# Patient Record
Sex: FEMALE | Race: BLACK OR AFRICAN AMERICAN | Employment: FULL TIME | URBAN - METROPOLITAN AREA
[De-identification: names, ages, dates, MRNs, and addresses within clinical notes are randomized per-mention and may not be internally consistent; named-entity substitution may affect disease eponyms.]

---

## 2018-10-22 ENCOUNTER — HOSPITAL ENCOUNTER (OUTPATIENT)
Dept: LAB | Age: 43
Discharge: HOME | End: 2018-10-22
Payer: COMMERCIAL

## 2018-10-22 ENCOUNTER — OFFICE VISIT (OUTPATIENT)
Dept: FAMILY MEDICINE CLINIC | Age: 43
End: 2018-10-22

## 2018-10-22 VITALS
SYSTOLIC BLOOD PRESSURE: 132 MMHG | HEIGHT: 63 IN | WEIGHT: 160 LBS | OXYGEN SATURATION: 99 % | DIASTOLIC BLOOD PRESSURE: 78 MMHG | RESPIRATION RATE: 18 BRPM | HEART RATE: 69 BPM | BODY MASS INDEX: 28.35 KG/M2 | TEMPERATURE: 98.4 F

## 2018-10-22 DIAGNOSIS — E66.3 OVERWEIGHT (BMI 25.0-29.9): ICD-10-CM

## 2018-10-22 DIAGNOSIS — Z01.419 WELL WOMAN EXAM WITH ROUTINE GYNECOLOGICAL EXAM: ICD-10-CM

## 2018-10-22 DIAGNOSIS — Z12.39 SCREENING FOR MALIGNANT NEOPLASM OF BREAST: ICD-10-CM

## 2018-10-22 DIAGNOSIS — Z76.89 ESTABLISHING CARE WITH NEW DOCTOR, ENCOUNTER FOR: Primary | ICD-10-CM

## 2018-10-22 PROCEDURE — 87624 HPV HI-RISK TYP POOLED RSLT: CPT | Performed by: FAMILY MEDICINE

## 2018-10-22 PROCEDURE — 88175 CYTOPATH C/V AUTO FLUID REDO: CPT | Performed by: FAMILY MEDICINE

## 2018-10-22 RX ORDER — ALBUTEROL SULFATE 90 UG/1
AEROSOL, METERED RESPIRATORY (INHALATION)
Refills: 6 | COMMUNITY
Start: 2018-09-04

## 2018-10-22 RX ORDER — BISMUTH SUBSALICYLATE 262 MG
1 TABLET,CHEWABLE ORAL DAILY
COMMUNITY

## 2018-10-22 RX ORDER — ASCORBIC ACID 250 MG
TABLET ORAL
COMMUNITY

## 2018-10-22 NOTE — PROGRESS NOTES
[facility] Note Subjective: Chief Complaint Patient presents with  New Patient  
  establish care  Other  
  needs referrel for mammo and pap Janusz Norris is a 37y.o. year old female who presents for evaluation of the following: 
 
Establishment of Care: 
Previous PCP: NP [facility] Care Team:  
Dentist- seen in past 1 year Optho- seen inpast 1 year GYN- None Allergist: [facility] PMH:  
Asthma: Tx: Proair prn + Dulera daily Using Albuterol use 3 times in past 2 months Never hospitalized, intubated Eczema:  
Palmar hands Tx: triamcinolone cream 
 
Allergies: 
Gets allergy shots for 5 years Tx: zyrtec Acute Concerns: 
Overweight: \"It doesn't come off\" Diet: \"I have a sweet tooth\" eats cake only once per week and not drinking soda, coffee with sweetened creamer,  
- dinner last night was butternut squash soup - Lunch spring rolls - Breakfast oatmeal 
Activity: Walks the dog 14 mile daily Goal: 10 lbs Social:  
Moved to  [facility] from [place] 6 years ago. [place] [address] [place] [address] as  Lives with  and 2 sons (25 and 15) Mood is \"good\" Health Maintenance:  
TDP: Not due Influenza: Declined Pneumovax:Done 10 year ago in [place] for asthma Prevnar @65: Not due Shingles @60: Not due Colonoscopy @50: Not due/ No family history ASA @ 55f and 45m: Not due Dexa @65: Not due HIV or other STD testing: Declined/ Due 
Domestic Violence Screen: Negative Depression Screen: Denies depression or anhedonia PHQ over the last two weeks 10/22/2018 Little interest or pleasure in doing things Not at all Feeling down, depressed, irritable, or hopeless Not at all Total Score PHQ 2 0 Smoker? No 
 
 
 Pap:  Last >5 years ago, never abnomal 
Mammogram:  Never had one. Patient's last menstrual period was 10/15/2018 (approximate). Menses Monthly, lasting 5 days. No recent worsening bleeding or intermenstrual bleeding 
 reports that she currently engages in sexual activity and has had partners who are Male. She reports using the following method of birth control/protection: None. Review of Systems Pertinent positives and negative per HPI. All other systems  reviewed are negative for a Comprehensive ROS (10+). Past Medical History:  
Diagnosis Date  Asthma  Eczema  H/O seasonal allergies Social History Socioeconomic History  Marital status:  Spouse name: Not on file  Number of children: Not on file  Years of education: Not on file  Highest education level: Not on file Social Needs  Financial resource strain: Not on file  Food insecurity - worry: Not on file  Food insecurity - inability: Not on file  Transportation needs - medical: Not on file  Transportation needs - non-medical: Not on file Occupational History  Not on file Tobacco Use  Smoking status: Never Smoker  Smokeless tobacco: Never Used Substance and Sexual Activity  Alcohol use: Yes Comment: ocassionally  Drug use: No  
 Sexual activity: Yes  
  Partners: Male Birth control/protection: None Other Topics Concern  Not on file Social History Narrative  Not on file Current Outpatient Medications Medication Sig  
 PROAIR HFA 90 mcg/actuation inhaler TAKE 2 PUFFS BY MOUTH EVERY 4 HOURS AS NEEDED  
 B.infantis-B.ani-B.long-B.bifi (PROBIOTIC 4X) 10-15 mg TbEC Take  by mouth.  ascorbic acid, vitamin C, (VITAMIN C) 250 mg tablet Take  by mouth.  multivitamin (ONE A DAY) tablet Take 1 Tab by mouth daily.  mometasone-formoterol (DULERA) 100-5 mcg/actuation HFA inhaler Take 2 Puffs by inhalation two (2) times a day.  Cetirizine 10 mg cap Take  by mouth.   
 triamcinolone acetonide (KENALOG) 0.1 % ointment Apply  to affected area three (3) times daily. use thin layer  clobetasol-emollient (TEMOVATE-E) 0.05 % topical cream Apply  to affected area two (2) times a day.  ipratropium-albuterol (COMBIVENT)  mcg/actuation inhaler Take  by inhalation every six (6) hours as needed for Wheezing. No current facility-administered medications for this visit. Objective:  
 
Vitals:  
 10/22/18 0820 BP: 132/78 Pulse: 69 Resp: 18 Temp: 98.4 °F (36.9 °C) TempSrc: Oral  
SpO2: 99% Weight: 160 lb (72.6 kg) Height: 5' 3\" (1.6 m) Physical Examination: 
General: Alert, cooperative, no distress, appears stated age. Eyes: Conjunctivae clear. PERRL, EOMs intact. Ears: Normal external ear canals both ears. TM clear and mobile bilaterally Nose: Nares normal. Septum midline. Mucosa normal. No drainage or sinus tenderness. Mouth/Throat: Lips, mucosa, and tongue normal.  
Neck: Supple, symmetrical, trachea midline, no adenopathy. No thyroid enlargement/tenderness/nodules Back: Symmetric, no curvature. ROM normal.  
Lungs: Clear to auscultation bilaterally. Normal inspiratory and expiratory ratio. Heart: Regular rate and rhythm, S1, S2 normal, no murmur, click, rub or gallop. Abdomen: Soft, non-tender. Bowel sounds normal. No masses or organomegaly. Extremities: Extremities normal, atraumatic, no cyanosis or edema. Pulses: 2+ and symmetric all extremities. Skin: Skin color, texture, turgor normal. No rashes or lesions on exposed skin. Lymph nodes: Cervical, supraclavicular nodes normal. 
Neurologic: CNII-XII intact. Strength 5/5 grossly. Sensation and reflexes normal throughout. No visits with results within 3 Month(s) from this visit. Latest known visit with results is:  
Office Visit on 01/11/2016 Component Date Value Ref Range Status  Glucose 01/16/2016 95  65 - 99 mg/dL Final  
 BUN 01/16/2016 10  6 - 24 mg/dL Final  
 Creatinine 01/16/2016 0.96  0.57 - 1.00 mg/dL Final  
  GFR est non-AA 01/16/2016 73  >59 mL/min/1.73 Final  
 GFR est AA 01/16/2016 84  >59 mL/min/1.73 Final  
 BUN/Creatinine ratio 01/16/2016 10  9 - 23 Final  
 Sodium 01/16/2016 139  134 - 144 mmol/L Final  
 Potassium 01/16/2016 4.1  3.5 - 5.2 mmol/L Final  
 Chloride 01/16/2016 103  97 - 108 mmol/L Final  
 CO2 01/16/2016 23  18 - 29 mmol/L Final  
 Calcium 01/16/2016 8.8  8.7 - 10.2 mg/dL Final  
 Cholesterol, total 01/16/2016 204* 100 - 199 mg/dL Final  
 Triglyceride 01/16/2016 93  0 - 149 mg/dL Final  
 HDL Cholesterol 01/16/2016 57  >39 mg/dL Final  
 Comment: According to ATP-III Guidelines, HDL-C >59 mg/dL is considered a 
negative risk factor for CHD.  VLDL, calculated 01/16/2016 19  5 - 40 mg/dL Final  
 LDL, calculated 01/16/2016 128* 0 - 99 mg/dL Final  
 ALT (SGPT) 01/16/2016 11  0 - 32 IU/L Final  
 AST (SGOT) 01/16/2016 15  0 - 40 IU/L Final  
 Specific Gravity 01/16/2016 1.022  1.005 - 1.030 Final  
 pH (UA) 01/16/2016 6.0  5.0 - 7.5 Final  
 Color 01/16/2016 Yellow  Yellow Final  
 Appearance 01/16/2016 Clear  Clear Final  
 Leukocyte Esterase 01/16/2016 Negative  Negative Final  
 Protein 01/16/2016 Negative  Negative/Trace Final  
 Glucose 01/16/2016 Negative  Negative Final  
 Ketone 01/16/2016 Negative  Negative Final  
 Blood 01/16/2016 Negative  Negative Final  
 Bilirubin 01/16/2016 Negative  Negative Final  
 Urobilinogen 01/16/2016 0.2  0.2 - 1.0 mg/dL Final  
 Nitrites 01/16/2016 Negative  Negative Final  
 Microscopic Examination 01/16/2016 Comment   Final  
 Microscopic not indicated and not performed.  Hemoglobin A1c 01/16/2016 5.9* 4.8 - 5.6 % Final  
 Comment:          Pre-diabetes: 5.7 - 6.4 Diabetes: >6.4 Glycemic control for adults with diabetes: <7.0  Estimated average glucose 01/16/2016 123  mg/dL Final  
 INTERPRETATION 01/16/2016 Note   Final  
 Supplement report is available. Assessment/ Plan: Diagnoses and all orders for this visit: 
 
Establishing care with new doctor, encounter for Well woman exam with routine gynecological exam 
-     CBC WITH AUTOMATED DIFF 
-     METABOLIC PANEL, COMPREHENSIVE 
-     LIPID PANEL 
-     HEMOGLOBIN A1C WITH EAG 
-     PAP IG, APTIMA HPV AND RFX 16/18,45 ([id number]); Future -     DREAD MAMMO BI SCREENING INCL CAD; Future Overweight (BMI 25.0-29. 9) Screening for malignant neoplasm of breast 
-     DREAD MAMMO BI SCREENING INCL CAD; Future Doing well overall Abnormal findings discussed below. STI screen declined. Labs to eval end organ function today Vaccines reviewed, PCV done 10 years ago Pap done today Mammogram ordered. Diet and activity modification encouraged for weight loss. Educated patient on red flag symptoms to warrant return to clinic or emergency room visit. I have discussed the diagnosis with the patient and the intended plan as seen in the above orders. The patient has been offered or received an after-visit summary and questions were answered concerning future plans. I have discussed medication side effects and warnings with the patient as well. Follow-up Disposition: 
Return in about 6 months (around 4/22/2019) for Follow Up. Signed, Lauren Lynn MD 
10/22/2018

## 2018-10-22 NOTE — PATIENT INSTRUCTIONS
Weight Loss Tips: 
 
Remember this is like a part time job so your motivation and commitment is key to your success. Use small plate only Drink 2 liters (1/2 gallon) of water every day 1/2 of every meal is fruit or vegetable Try meal prepping on Sunday with new different vegetables. Replace soda with diet soda or other zero sugar drinks (selter water just fine) Start using the [employer] laura for calorie counting. Goal 1800 calories per day Start work out at least 5 days per week for 40 minutes. Consider [employer] training laura for home exercises. Well Visit, Ages 25 to 48: Care Instructions Your Care Instructions Physical exams can help you stay healthy. Your doctor has checked your overall health and may have suggested ways to take good care of yourself. He or she also may have recommended tests. At home, you can help prevent illness with healthy eating, regular exercise, and other steps. Follow-up care is a key part of your treatment and safety. Be sure to make and go to all appointments, and call your doctor if you are having problems. It's also a good idea to know your test results and keep a list of the medicines you take. How can you care for yourself at home? · Reach and stay at a healthy weight. This will lower your risk for many problems, such as obesity, diabetes, heart disease, and high blood pressure. · Get at least 30 minutes of physical activity on most days of the week. Walking is a good choice. You also may want to do other activities, such as running, swimming, cycling, or playing tennis or team sports. Discuss any changes in your exercise program with your doctor. · Do not smoke or allow others to smoke around you. If you need help quitting, talk to your doctor about stop-smoking programs and medicines. These can increase your chances of quitting for good.  
· Talk to your doctor about whether you have any risk factors for sexually transmitted infections (STIs). Having one sex partner (who does not have STIs and does not have sex with anyone else) is a good way to avoid these infections. · Use birth control if you do not want to have children at this time. Talk with your doctor about the choices available and what might be best for you. · Protect your skin from too much sun. When you're outdoors from 10 a.m. to 4 p.m., stay in the shade or cover up with clothing and a hat with a wide brim. Wear sunglasses that block UV rays. Even when it's cloudy, put broad-spectrum sunscreen (SPF 30 or higher) on any exposed skin. · See a dentist one or two times a year for checkups and to have your teeth cleaned. · Wear a seat belt in the car. · Drink alcohol in moderation, if at all. That means no more than 2 drinks a day for men and 1 drink a day for women. Follow your doctor's advice about when to have certain tests. These tests can spot problems early. For everyone · Cholesterol. Have the fat (cholesterol) in your blood tested after age 21. Your doctor will tell you how often to have this done based on your age, family history, or other things that can increase your risk for heart disease. · Blood pressure. Have your blood pressure checked during a routine doctor visit. Your doctor will tell you how often to check your blood pressure based on your age, your blood pressure results, and other factors. · Vision. Talk with your doctor about how often to have a glaucoma test. 
· Diabetes. Ask your doctor whether you should have tests for diabetes. · Colon cancer. Have a test for colon cancer at age 48. You may have one of several tests. If you are younger than 48, you may need a test earlier if you have any risk factors. Risk factors include whether you already had a precancerous polyp removed from your colon or whether your parent, brother, sister, or child has had colon cancer. For women · Breast exam and mammogram. Talk to your doctor about when you should have a clinical breast exam and a mammogram. Medical experts differ on whether and how often women under 50 should have these tests. Your doctor can help you decide what is right for you. · Pap test and pelvic exam. Begin Pap tests at age 24. A Pap test is the best way to find cervical cancer. The test often is part of a pelvic exam. Ask how often to have this test. 
· Tests for sexually transmitted infections (STIs). Ask whether you should have tests for STIs. You may be at risk if you have sex with more than one person, especially if your partners do not wear condoms. For men · Tests for sexually transmitted infections (STIs). Ask whether you should have tests for STIs. You may be at risk if you have sex with more than one person, especially if you do not wear a condom. · Testicular cancer exam. Ask your doctor whether you should check your testicles regularly. · Prostate exam. Talk to your doctor about whether you should have a blood test (called a PSA test) for prostate cancer. Experts differ on whether and when men should have this test. Some experts suggest it if you are older than 39 and are -American or have a father or brother who got prostate cancer when he was younger than 72. When should you call for help? Watch closely for changes in your health, and be sure to contact your doctor if you have any problems or symptoms that concern you. Where can you learn more? Go to http://linsey-darlene.info/. Enter P072 in the search box to learn more about \"Well Visit, Ages 25 to 48: Care Instructions. \" Current as of: March 29, 2018 Content Version: 11.8 © 0051-5878 Healthwise, Incorporated. Care instructions adapted under license by [employer] (which disclaims liability or warranty for this information).  If you have questions about a medical condition or this instruction, always ask your healthcare professional. [place] [address] any warranty or liability for your use of this information.

## 2018-10-23 LAB
ALBUMIN SERPL-MCNC: 4.3 G/DL (ref 3.5–5.5)
ALBUMIN/GLOB SERPL: 2 {RATIO} (ref 1.2–2.2)
ALP SERPL-CCNC: 41 IU/L (ref 39–117)
ALT SERPL-CCNC: 15 IU/L (ref 0–32)
AST SERPL-CCNC: 15 IU/L (ref 0–40)
BASOPHILS # BLD AUTO: 0 X10E3/UL (ref 0–0.2)
BASOPHILS NFR BLD AUTO: 0 %
BILIRUB SERPL-MCNC: 0.3 MG/DL (ref 0–1.2)
BUN SERPL-MCNC: 10 MG/DL (ref 6–24)
BUN/CREAT SERPL: 10 (ref 9–23)
CALCIUM SERPL-MCNC: 9.2 MG/DL (ref 8.7–10.2)
CHLORIDE SERPL-SCNC: 102 MMOL/L (ref 96–106)
CHOLEST SERPL-MCNC: 217 MG/DL (ref 100–199)
CO2 SERPL-SCNC: 24 MMOL/L (ref 20–29)
CREAT SERPL-MCNC: 1.01 MG/DL (ref 0.57–1)
EOSINOPHIL # BLD AUTO: 0.1 X10E3/UL (ref 0–0.4)
EOSINOPHIL NFR BLD AUTO: 2 %
ERYTHROCYTE [DISTWIDTH] IN BLOOD BY AUTOMATED COUNT: 14.2 % (ref 12.3–15.4)
EST. AVERAGE GLUCOSE BLD GHB EST-MCNC: 114 MG/DL
GLOBULIN SER CALC-MCNC: 2.2 G/DL (ref 1.5–4.5)
GLUCOSE SERPL-MCNC: 89 MG/DL (ref 65–99)
HBA1C MFR BLD: 5.6 % (ref 4.8–5.6)
HCT VFR BLD AUTO: 39.6 % (ref 34–46.6)
HDLC SERPL-MCNC: 62 MG/DL
HGB BLD-MCNC: 13.5 G/DL (ref 11.1–15.9)
IMM GRANULOCYTES # BLD: 0 X10E3/UL (ref 0–0.1)
IMM GRANULOCYTES NFR BLD: 0 %
INTERPRETATION, 910389: NORMAL
LDLC SERPL CALC-MCNC: 141 MG/DL (ref 0–99)
LYMPHOCYTES # BLD AUTO: 1.2 X10E3/UL (ref 0.7–3.1)
LYMPHOCYTES NFR BLD AUTO: 29 %
MCH RBC QN AUTO: 29.7 PG (ref 26.6–33)
MCHC RBC AUTO-ENTMCNC: 34.1 G/DL (ref 31.5–35.7)
MCV RBC AUTO: 87 FL (ref 79–97)
MONOCYTES # BLD AUTO: 0.4 X10E3/UL (ref 0.1–0.9)
MONOCYTES NFR BLD AUTO: 9 %
NEUTROPHILS # BLD AUTO: 2.5 X10E3/UL (ref 1.4–7)
NEUTROPHILS NFR BLD AUTO: 60 %
PLATELET # BLD AUTO: 237 X10E3/UL (ref 150–379)
POTASSIUM SERPL-SCNC: 4.4 MMOL/L (ref 3.5–5.2)
PROT SERPL-MCNC: 6.5 G/DL (ref 6–8.5)
RBC # BLD AUTO: 4.54 X10E6/UL (ref 3.77–5.28)
SODIUM SERPL-SCNC: 140 MMOL/L (ref 134–144)
TRIGL SERPL-MCNC: 68 MG/DL (ref 0–149)
VLDLC SERPL CALC-MCNC: 14 MG/DL (ref 5–40)
WBC # BLD AUTO: 4.1 X10E3/UL (ref 3.4–10.8)

## 2018-10-25 NOTE — PROGRESS NOTES
Notify Patient: 
Most of your results look good. One of your cholesterol numbers was higher than normal. I recommend you adjust your diet to avoid foods monika in saturated fats such as greasy snack, fried food, and processed meats. Try replacing them with whole foods, like fruits and vegetables. Weight loss will also help with this.   
 
 
MD Note: 
ASCVD Risk 1.1%

## 2018-10-26 NOTE — PROGRESS NOTES
Outbound call to patient. No answer. Left message for patient to return call to office regarding labs. Results are also available on my chart.

## 2018-10-29 NOTE — PROGRESS NOTES
Notify Patient:  Your pap smear testing was negative for cervical cancer and the virus which causes it. Your next pap smear is due tin 5 years.

## 2018-11-01 ENCOUNTER — HOSPITAL ENCOUNTER (OUTPATIENT)
Dept: MAMMOGRAPHY | Age: 43
Discharge: HOME | End: 2018-11-01
Payer: COMMERCIAL

## 2018-11-01 DIAGNOSIS — Z12.39 SCREENING FOR MALIGNANT NEOPLASM OF BREAST: ICD-10-CM

## 2018-11-01 DIAGNOSIS — Z01.419 WELL WOMAN EXAM WITH ROUTINE GYNECOLOGICAL EXAM: ICD-10-CM

## 2018-11-01 PROCEDURE — 77067 SCR MAMMO BI INCL CAD: CPT

## 2018-11-01 NOTE — PROGRESS NOTES
Outbound call to patient. No answer. Left message for patient to return call regarding recent test results. Her results are also available to view on my chart to call with any questions.

## 2019-12-19 ENCOUNTER — HOSPITAL ENCOUNTER (OUTPATIENT)
Dept: MAMMOGRAPHY | Age: 44
Discharge: HOME | End: 2019-12-19
Payer: COMMERCIAL

## 2019-12-19 DIAGNOSIS — Z12.31 VISIT FOR SCREENING MAMMOGRAM: ICD-10-CM

## 2019-12-19 PROCEDURE — 77067 SCR MAMMO BI INCL CAD: CPT

## 2020-05-27 ENCOUNTER — VIRTUAL VISIT (OUTPATIENT)
Dept: FAMILY MEDICINE CLINIC | Age: 45
End: 2020-05-27

## 2020-05-27 ENCOUNTER — TELEPHONE (OUTPATIENT)
Dept: FAMILY MEDICINE CLINIC | Age: 45
End: 2020-05-27

## 2020-05-27 DIAGNOSIS — J45.909 UNCOMPLICATED ASTHMA, UNSPECIFIED ASTHMA SEVERITY, UNSPECIFIED WHETHER PERSISTENT: Primary | ICD-10-CM

## 2020-05-27 DIAGNOSIS — L30.9 ECZEMA OF BOTH HANDS: ICD-10-CM

## 2020-05-27 DIAGNOSIS — J30.1 ALLERGIC RHINITIS DUE TO POLLEN, UNSPECIFIED SEASONALITY: ICD-10-CM

## 2020-05-27 DIAGNOSIS — M25.511 ACUTE PAIN OF RIGHT SHOULDER: ICD-10-CM

## 2020-05-27 DIAGNOSIS — Z87.898 HISTORY OF PREDIABETES: ICD-10-CM

## 2020-05-27 NOTE — PATIENT INSTRUCTIONS
Shoulder Stretches: Exercises Introduction Here are some examples of exercises for you to try. The exercises may be suggested for a condition or for rehabilitation. Start each exercise slowly. Ease off the exercises if you start to have pain. You will be told when to start these exercises and which ones will work best for you. How to do the exercises Shoulder stretch 1.  a doorway and place one arm against the door frame. Your elbow should be a little higher than your shoulder. 2. Relax your shoulders as you lean forward, allowing your chest and shoulder muscles to stretch. You can also turn your body slightly away from your arm to stretch the muscles even more. 3. Hold for 15 to 30 seconds. 4. Repeat 2 to 4 times with each arm. Shoulder and chest stretch 1. Shoulder and chest stretch 2. While sitting, relax your upper body so you slump slightly in your chair. 3. As you breathe in, straighten your back and open your arms out to the sides. 4. Gently pull your shoulder blades back and downward. 5. Hold for 15 to 30 seconds as your breathe normally. 6. Repeat 2 to 4 times. Overhead stretch 1. Reach up over your head with both arms. 2. Hold for 15 to 30 seconds. 3. Repeat 2 to 4 times. Follow-up care is a key part of your treatment and safety. Be sure to make and go to all appointments, and call your doctor if you are having problems. It's also a good idea to know your test results and keep a list of the medicines you take. Where can you learn more? Go to http://linsey-darlene.info/ Enter S254 in the search box to learn more about \"Shoulder Stretches: Exercises. \" Current as of: June 26, 2019Content Version: 12.4 © 3310-0104 Healthwise, Incorporated. Care instructions adapted under license by [employer] (which disclaims liability or warranty for this information).  If you have questions about a medical condition or this instruction, always ask your healthcare professional. [address] any warranty or liability for your use of this information. Neck: Exercises Introduction Here are some examples of exercises for you to try. The exercises may be suggested for a condition or for rehabilitation. Start each exercise slowly. Ease off the exercises if you start to have pain. You will be told when to start these exercises and which ones will work best for you. How to do the exercises Neck stretch 5. This stretch works best if you keep your shoulder down as you lean away from it. To help you remember to do this, start by relaxing your shoulders and lightly holding on to your thighs or your chair. 6. Tilt your head toward your shoulder and hold for 15 to 30 seconds. Let the weight of your head stretch your muscles. 7. If you would like a little added stretch, use your hand to gently and steadily pull your head toward your shoulder. For example, keeping your right shoulder down, lean your head to the left. 8. Repeat 2 to 4 times toward each shoulder. Diagonal neck stretch 7. Turn your head slightly toward the direction you will be stretching, and tilt your head diagonally toward your chest and hold for 15 to 30 seconds. 8. If you would like a little added stretch, use your hand to gently and steadily pull your head forward on the diagonal. 
9. Repeat 2 to 4 times toward each side. Dorsal glide stretch 4. Sit or stand tall and look straight ahead. 5. Slowly tuck your chin as you glide your head backward over your body 6. Hold for a count of 6, and then relax for up to 10 seconds. 7. Repeat 8 to 12 times. Chest and shoulder stretch 1. Sit or stand tall and glide your head backward as in the dorsal glide stretch. 2. Raise both arms so that your hands are next to your ears. 3. Take a deep breath, and as you breathe out, lower your elbows down and behind your back. You will feel your shoulder blades slide down and together, and at the same time you will feel a stretch across your chest and the front of your shoulders. 4. Hold for about 6 seconds, and then relax for up to 10 seconds. 5. Repeat 8 to 12 times. Strengthening: Hands on head 1. Move your head backward, forward, and side to side against gentle pressure from your hands, holding each position for about 6 seconds. 2. Repeat 8 to 12 times. Follow-up care is a key part of your treatment and safety. Be sure to make and go to all appointments, and call your doctor if you are having problems. It's also a good idea to know your test results and keep a list of the medicines you take. Where can you learn more? Go to http://linsey-darlene.info/ Enter P975 in the search box to learn more about \"Neck: Exercises. \" Current as of: June 26, 2019Content Version: 12.4 © 4808-4750 Healthwise, Incorporated. Care instructions adapted under license by [employer] (which disclaims liability or warranty for this information). If you have questions about a medical condition or this instruction, always ask your healthcare professional. [address] any warranty or liability for your use of this information.

## 2020-05-27 NOTE — PROGRESS NOTES
[address] [address] Note      Subjective:     Chief Complaint   Patient presents with    Shoulder Pain     Magnolia Lewis is a 53y.o. year old female who presents for virtual evaluation of the following:      Shoulder Pain:   Onset: 1 month ago  Character: burn, soreness  Location: right neck, posterior right shoulder  Duration: intermittnent  Frequency: daily  Current Pain Ratin/10  Alleviating Factor: None  Aggravating Factor: sleeping on right  Treatment: ibuprofen 400mg prn  Endorses: None  Denies injury, joint swelling, chest pain, abdominal pain, radiation    Asthma:   Tx: Proair prn + Dulera daily  Using Albuterol use 3 times in past 2 months  Never hospitalized, intubated     Eczema:   Palmar hands  Tx: triamcinolone cream     Allergies:  Gets allergy shots for 5 years  Tx: zyrtec       Prediabetes:  Tx: None  Lab Results   Component Value Date/Time    Hemoglobin A1c 5.6 10/22/2018 09:28 AM         Social:   Moved to [place] from [place] 8 years ago. Originally from [place]  Works at [employer] as   Lives with  and 2 sons (25 and 15)    Care Team:   Dentist- seen in past 1 year  Optho- seen inpast 1 year   GYN- None  Allergist: [facility]      Review of Systems   Pertinent positives and negative per HPI. All other systems  reviewed are negative for a Comprehensive ROS (10+).        Past Medical History:   Diagnosis Date    Asthma     Eczema     H/O seasonal allergies         Social History     Socioeconomic History    Marital status:      Spouse name: Not on file    Number of children: Not on file    Years of education: Not on file    Highest education level: Not on file   Occupational History    Not on file   Social Needs    Financial resource strain: Not on file    Food insecurity     Worry: Not on file     Inability: Not on file    Transportation needs     Medical: Not on file     Non-medical: Not on file   Tobacco Use    Smoking status: Never Smoker    Smokeless tobacco: Never Used   Substance and Sexual Activity    Alcohol use: Yes     Comment: ocassionally     Drug use: No    Sexual activity: Yes     Partners: Male     Birth control/protection: None   Lifestyle    Physical activity     Days per week: Not on file     Minutes per session: Not on file    Stress: Not on file   Relationships    Social connections     Talks on phone: Not on file     Gets together: Not on file     Attends Yazidism service: Not on file     Active member of club or organization: Not on file     Attends meetings of clubs or organizations: Not on file     Relationship status: Not on file    Intimate partner violence     Fear of current or ex partner: Not on file     Emotionally abused: Not on file     Physically abused: Not on file     Forced sexual activity: Not on file   Other Topics Concern    Not on file   Social History Narrative    Not on file       Family History   Problem Relation Age of Onset    Hypertension Mother     Diabetes Father     Diabetes Maternal Grandmother     Hypertension Maternal Grandmother     Stroke Maternal Grandmother     Cancer Maternal Grandfather         lung-heavy smoker     Diabetes Maternal Grandfather     Hypertension Maternal Grandfather     Diabetes Paternal Grandmother     Hypertension Paternal Grandmother     Stroke Paternal Grandmother     Diabetes Paternal Grandfather     Hypertension Paternal Grandfather        Current Outpatient Medications   Medication Sig    PROAIR HFA 90 mcg/actuation inhaler TAKE 2 PUFFS BY MOUTH EVERY 4 HOURS AS NEEDED    B.infantis-B.ani-B.long-B.bifi (PROBIOTIC 4X) 10-15 mg TbEC Take  by mouth.  ascorbic acid, vitamin C, (VITAMIN C) 250 mg tablet Take  by mouth.  multivitamin (ONE A DAY) tablet Take 1 Tab by mouth daily.  mometasone-formoterol (DULERA) 100-5 mcg/actuation HFA inhaler Take 2 Puffs by inhalation two (2) times a day.     Cetirizine 10 mg cap Take  by mouth.  triamcinolone acetonide (KENALOG) 0.1 % ointment Apply  to affected area three (3) times daily. use thin layer     No current facility-administered medications for this visit. Objective: There were no vitals filed for this visit. Vitals measurement not available    Physical Examination:  General: Alert, cooperative, no distress, appears stated age. Eyes: Conjunctivae clear. Pupils equally round. Extraocular muscles intact. Ears: Normal appearing external ear   Nose: Nares normal appearing  Mouth/Throat: Lips, mucosa, and tongue normal. Moist mucous membranes. No tonsillar enlargement noted. Neck: Supple, symmetrical, trachea midline, no neck mass visualized. Respiratory: Breathing comfortably, in no acute respiratory distress. Cardiovascular: Visualized extremities without edema. MSK: Upper extremities normal appearing. Skin: No significant erythematous lesions or discoloration noted on facial skin. No rashes or lesions on exposed skin. Neurologic: No facial asymmetry. Normal gaze. Cranial nerves intact. Psychiatric: Affect appropriate. Mood euthymic. Thoughts logical. Speech volume and speed normal. No hallucinations. Well kempt. No visits with results within 3 Month(s) from this visit.    Latest known visit with results is:   Office Visit on 10/22/2018   Component Date Value Ref Range Status    WBC 10/22/2018 4.1  3.4 - 10.8 x10E3/uL Final    RBC 10/22/2018 4.54  3.77 - 5.28 x10E6/uL Final    HGB 10/22/2018 13.5  11.1 - 15.9 g/dL Final    HCT 10/22/2018 39.6  34.0 - 46.6 % Final    MCV 10/22/2018 87  79 - 97 fL Final    MCH 10/22/2018 29.7  26.6 - 33.0 pg Final    MCHC 10/22/2018 34.1  31.5 - 35.7 g/dL Final    RDW 10/22/2018 14.2  12.3 - 15.4 % Final    PLATELET 07/[birth year]1987 197  150 - 379 x10E3/uL Final    NEUTROPHILS 10/22/2018 60  Not Estab. % Final    Lymphocytes 10/22/2018 29  Not Estab. % Final    MONOCYTES 10/22/2018 9  Not Estab. % Final    EOSINOPHILS 10/22/2018 2  Not Estab. % Final    BASOPHILS 10/22/2018 0  Not Estab. % Final    ABS. NEUTROPHILS 10/22/2018 2.5  1.4 - 7.0 x10E3/uL Final    Abs Lymphocytes 10/22/2018 1.2  0.7 - 3.1 x10E3/uL Final    ABS. MONOCYTES 10/22/2018 0.4  0.1 - 0.9 x10E3/uL Final    ABS. EOSINOPHILS 10/22/2018 0.1  0.0 - 0.4 x10E3/uL Final    ABS. BASOPHILS 10/22/2018 0.0  0.0 - 0.2 x10E3/uL Final    IMMATURE GRANULOCYTES 10/22/2018 0  Not Estab. % Final    ABS. IMM. GRANS. 10/22/2018 0.0  0.0 - 0.1 x10E3/uL Final    Glucose 10/22/2018 89  65 - 99 mg/dL Final    BUN 10/22/2018 10  6 - 24 mg/dL Final    Creatinine 10/22/2018 1.01* 0.57 - 1.00 mg/dL Final    GFR est non-AA 10/22/2018 67  >59 mL/min/1.73 Final    GFR est AA 10/22/2018 78  >59 mL/min/1.73 Final    BUN/Creatinine ratio 10/22/2018 10  9 - 23 Final    Sodium 10/22/2018 140  134 - 144 mmol/L Final    Potassium 10/22/2018 4.4  3.5 - 5.2 mmol/L Final    Chloride 10/22/2018 102  96 - 106 mmol/L Final    CO2 10/22/2018 24  20 - 29 mmol/L Final    Calcium 10/22/2018 9.2  8.7 - 10.2 mg/dL Final    Protein, total 10/22/2018 6.5  6.0 - 8.5 g/dL Final    Albumin 10/22/2018 4.3  3.5 - 5.5 g/dL Final    GLOBULIN, TOTAL 10/22/2018 2.2  1.5 - 4.5 g/dL Final    A-G Ratio 10/22/2018 2.0  1.2 - 2.2 Final    Bilirubin, total 10/22/2018 0.3  0.0 - 1.2 mg/dL Final    Alk.  phosphatase 10/22/2018 41  39 - 117 IU/L Final    AST (SGOT) 10/22/2018 15  0 - 40 IU/L Final    ALT (SGPT) 10/22/2018 15  0 - 32 IU/L Final    Cholesterol, total 10/22/2018 217* 100 - 199 mg/dL Final    Triglyceride 10/22/2018 68  0 - 149 mg/dL Final    HDL Cholesterol 10/22/2018 62  >39 mg/dL Final    VLDL, calculated 10/22/2018 14  5 - 40 mg/dL Final    LDL, calculated 10/22/2018 141* 0 - 99 mg/dL Final    Hemoglobin A1c 10/22/2018 5.6  4.8 - 5.6 % Final    Comment:          Prediabetes: 5.7 - 6.4           Diabetes: >6.4           Glycemic control for adults with diabetes: <7.0  Estimated average glucose 10/22/2018 114  mg/dL Final    INTERPRETATION 10/22/2018 Note   Final    Comment: Medical Director's Note: Patient Last Name has been  corrected on 10/23/2018, was TRINA and now is Alyssa Lopez. Please review this report in its entirety, since  changes to patient demographics may affect result  interpretation(s) and/or treatment/follow-up suggestions. Supplemental report is available. Assessment/ Plan:   Diagnoses and all orders for this visit:    1. Uncomplicated asthma, unspecified asthma severity, unspecified whether persistent    2. Eczema of both hands    3. Allergic rhinitis due to pollen, unspecified seasonality    4. Acute pain of right shoulder        For today's visit, I did the following:  · Reviewed PMH as listed in orders  Labs to eval end organ function and etiology of chronic/acute concerns. Asthma, allergy, and eczema, stable. Managed by allergist.  Exercises and NSAIDs for musculoskeletal concern- right shoulder pain likely tendinitis or muscle strain. Improving. Negative depression screen. Follow up with specialists per routine. We discussed the expected course, resolution and complications of the diagnosis(es) in detail. Medication risks, benefits, costs, interactions, and alternatives were discussed as indicated. I advised her to contact the office if her condition worsens, changes or fails to improve as anticipated. She expressed understanding with the diagnosis(es) and plan. Adenike Acevedo is a 53 y.o. female being evaluated by a video visit encounter for concerns as above. A caregiver was present when appropriate. Due to this being a TeleHealth encounter (During ZSLUD-38 public health emergency), evaluation of the following organ systems was limited: Vitals/Constitutional/EENT/Resp/CV/GI//MS/Neuro/Skin/Heme-Lymph-Imm.   Pursuant to the emergency declaration under the [address] [facility], [address]387 waiver authority and the [employer] Act, this Virtual  Visit was conducted, with patient's (and/or legal guardian's) consent, to reduce the patient's risk of exposure to COVID-19 and provide necessary medical care. Services were provided through a video synchronous discussion virtually to substitute for in-person clinic visit. Provider was in the office while conducting this encounter. Patient was at home during encounter. Other persons participating in call: None  Consent:  She and/or her healthcare decision maker is aware that this patient-initiated Telehealth encounter is a billable service, with coverage as determined by her insurance carrier. She is aware that she may receive a bill and has provided verbal consent to proceed: Yes  This virtual visit was conducted via [employer]. Pursuant to the emergency declaration under the [address] [facility], 1135 waiver authority and the [employer] Act, this Virtual  Visit was conducted to reduce the patient's risk of exposure to COVID-19 and provide continuity of care for an established patient. Services were provided through a video synchronous discussion virtually to substitute for in-person clinic visit. Due to this being a TeleHealth evaluation, many elements of the physical examination are unable to be assessed. Total Time: minutes: 21-30 minutes. Educated patient on red flag symptoms to warrant return to clinic or emergency room visit. I have discussed the diagnosis with the patient and the intended plan as seen in the above orders. The patient has been offered or received an after-visit summary and questions were answered concerning future plans. I have discussed medication side effects and warnings with the patient as well.          Follow-up and Dispositions    · Return in about 3 months (around 8/27/2020) for Physical exam.         Signed,    Edi Callejas MD  5/27/2020

## 2020-05-27 NOTE — TELEPHONE ENCOUNTER
Outbound call placed to patient. name and  verified. Call placed to schedule patients 3 month CPE.  Patient scheduled for Monday, August 10, 2020 09:30 AM

## 2020-06-22 ENCOUNTER — TELEPHONE (OUTPATIENT)
Dept: PHARMACY | Age: 45
End: 2020-06-22

## 2020-06-22 NOTE — TELEPHONE ENCOUNTER
CLINICAL PHARMACY CONSULT: ASTHMA INHALER REVIEW  Dalton Elaine is a 53 y.o. female Identified as an Asthma care gap for [place]: high medication possession ratio of rescue to maintenance inhalers. Attempt made to contact pt. Left msg asking for return call. Will continue to attempt to contact pt as appropriate.     Mynor Cooper, PharmD, [facility]  Direct: [phone]  Department, toll free: [phone], option 7

## 2020-06-24 NOTE — TELEPHONE ENCOUNTER
CLINICAL PHARMACY CONSULT: ASTHMA INHALER REVIEW    SUBJECTIVE:   Mirela Pretty is a 53 y.o. female Identified as an Asthma care gap for [place]: high medication possession ratio of rescue to maintenance inhalers. OBJECTIVE:  Allergies   Allergen Reactions    Latex Rash    Cat Dander Shortness of Breath    Codeine Other (comments)     Migraine     Pollen Extracts Shortness of Breath and Runny Nose    Tomato Runny Nose       Medications per current medication list:  Current Outpatient Medications   Medication Sig Dispense Refill    PROAIR HFA 90 mcg/actuation inhaler TAKE 2 PUFFS BY MOUTH EVERY 4 HOURS AS NEEDED  6    B.infantis-B.ani-B.long-B.bifi (PROBIOTIC 4X) 10-15 mg TbEC Take  by mouth.  ascorbic acid, vitamin C, (VITAMIN C) 250 mg tablet Take  by mouth.  multivitamin (ONE A DAY) tablet Take 1 Tab by mouth daily.  mometasone-formoterol (DULERA) 100-5 mcg/actuation HFA inhaler Take 2 Puffs by inhalation two (2) times a day.  Cetirizine 10 mg cap Take  by mouth.  triamcinolone acetonide (KENALOG) 0.1 % ointment Apply  to affected area three (3) times daily.  use thin layer 30 g 3     Social History:   Social History     Tobacco Use    Smoking status: Never Smoker    Smokeless tobacco: Never Used   Substance Use Topics    Alcohol use: Yes     Comment: ocassionally        Immunizations:   Most Recent Immunizations   Administered Date(s) Administered    Tdap 01/11/2016       ASSESSMENT:  · Current Prescribed Inhalers:  · Rescue: albuterol MDI   · Maintenance: Dulera 100-5- using 2 puffs qAM, sts as per her allergist  · Note, per external dispense 13g/30ds and 13g = 120 inhalations or 2 puffs BID  · Patient states her and her allergist (prescriber) agreed on once per day @1 year ago and has provided sufficient symptom control  · Other Medications: cetirizine daily  · Inhaler technique/medication adherence: reviewed  · Medication cost: denies concern  · Spirometry/PFT on file: not found  · Pneumonia vaccine: appears due for Pneumovax - thinks she had done in [place], prior to moving to [place]  · Smoking status: non  · Exacerbations requiring oral steroids: no  · Is patient on non-selective beta blocker or NSAIDS?  no   · Triggers/Environmental changes: pollen, dander  · Asthma Control Test (completed in flowsheet), total score: 23  · Other potential [place]-reported care gaps (statin adherence/SUPD/SPC): not identified      PLAN:   Discussed avoiding triggers - watches weather channel/laura   Discussed COVID precautions - works from home and tries to stay in/home   Patient will try to double check her prev medical records to confirm Pneumovax    Encouraged discussion/follow-up with allergist (regular/monthly albuterol fills from @Jan through April at least, but patient reports now using only @1x/week - would she benefit from [facility] increase to BID?) - politely declines need to outreach at this time, reporting her breathing is fine    Maynor Cooper, PharmD, [facility]  Direct: [phone]  Department, toll free: [phone], option 7      =========================================================   For Pharmacy Admin Tracking Only    PHSO: PHSO Patient?: Yes  Total # of Interventions Recommended: Count: 2  - Maintenance Safety Lab Monitoring #: 1  Recommended intervention potential cost savings: 0  Total Interventions Accepted: 0  Time Spent (min): 20

## 2020-09-15 ENCOUNTER — OFFICE VISIT (OUTPATIENT)
Dept: FAMILY MEDICINE CLINIC | Age: 45
End: 2020-09-15
Payer: COMMERCIAL

## 2020-09-15 VITALS
SYSTOLIC BLOOD PRESSURE: 139 MMHG | HEART RATE: 73 BPM | DIASTOLIC BLOOD PRESSURE: 82 MMHG | RESPIRATION RATE: 18 BRPM | OXYGEN SATURATION: 99 % | WEIGHT: 163 LBS | BODY MASS INDEX: 28.88 KG/M2 | HEIGHT: 63 IN

## 2020-09-15 DIAGNOSIS — Z23 ENCOUNTER FOR IMMUNIZATION: ICD-10-CM

## 2020-09-15 DIAGNOSIS — Z12.31 ENCOUNTER FOR SCREENING MAMMOGRAM FOR MALIGNANT NEOPLASM OF BREAST: ICD-10-CM

## 2020-09-15 DIAGNOSIS — N92.6 IRREGULAR PERIODS: ICD-10-CM

## 2020-09-15 DIAGNOSIS — E66.3 OVERWEIGHT: ICD-10-CM

## 2020-09-15 DIAGNOSIS — Z00.00 WELL WOMAN EXAM (NO GYNECOLOGICAL EXAM): Primary | ICD-10-CM

## 2020-09-15 DIAGNOSIS — Z87.898 HISTORY OF PREDIABETES: ICD-10-CM

## 2020-09-15 PROCEDURE — 90732 PPSV23 VACC 2 YRS+ SUBQ/IM: CPT

## 2020-09-15 PROCEDURE — 99396 PREV VISIT EST AGE 40-64: CPT | Performed by: FAMILY MEDICINE

## 2020-09-15 PROCEDURE — 90471 IMMUNIZATION ADMIN: CPT | Performed by: FAMILY MEDICINE

## 2020-09-15 NOTE — PROGRESS NOTES
[facility] Note      Subjective:     Chief Complaint   Patient presents with    Complete Physical     Pt states she would not like to get a pap smear     Piper Ortiz is a 50y.o. year old female who presents for evaluation of the following:      Overweight:   Diet: avoiding fast food. Dinner last night flavia pizzoli  Activity: Walks the dog daily  Goal: 10 lbs down. Last Weight Metrics:  Weight Loss Metrics 9/15/2020 10/22/2018 2016   Today's Wt 163 lb 160 lb 157 lb   BMI 28.87 kg/m2 28.34 kg/m2 27.82 kg/m2     Prediabetes:  Tx: None  Lab Results   Component Value Date/Time    Hemoglobin A1c 5.6 10/22/2018 09:28 AM     Social:   Moved to [place] from [place] 8 years ago. Originally from [place]  Works at [employer] as   Lives with  and 2 sons (25 and 15)    Care Team:   Dentist- seen in past 1 year  Optho- seen in past 1 year   GYN- None  Allergist: [facility] Maintenance:   Health Maintenance   Topic Date Due    Pneumococcal 0-64 years (1 of 1 - PPSV23) 1979    A1C test (Diabetic or Prediabetic)  10/22/2019    Flu Vaccine (1) 2020    PAP AKA CERVICAL CYTOLOGY  10/22/2023    Lipid Screen  10/22/2023    DTaP/Tdap/Td series (2 - Td) 2026       HIV or other STD testing: Declined  Domestic Violence Screen:   Abuse Screening Questionnaire 9/15/2020   Do you ever feel afraid of your partner? N   Are you in a relationship with someone who physically or mentally threatens you? N   Is it safe for you to go home? Y       Depression Screen:   3 most recent PHQ Screens 9/15/2020   Little interest or pleasure in doing things Not at all   Feeling down, depressed, irritable, or hopeless Not at all   Total Score PHQ 2 0       Smoker? never      Pap:  Not due  Mammogram:  Never had one. Patient's last menstrual period was 09/15/2020 (exact date).    Menses Mirregular aNo recent worsening bleeding or intermenstrual bleeding   reports being sexually active and has had partner(s) who are Male. She reports using the following method of birth control/protection: None. Review of Systems   Pertinent positives and negative per HPI. All other systems  reviewed are negative for a Comprehensive ROS (10+).        Past Medical History:   Diagnosis Date    Asthma     Eczema     H/O seasonal allergies         Social History     Socioeconomic History    Marital status:      Spouse name: Not on file    Number of children: Not on file    Years of education: Not on file    Highest education level: Not on file   Occupational History    Not on file   Social Needs    Financial resource strain: Not on file    Food insecurity     Worry: Not on file     Inability: Not on file    Transportation needs     Medical: Not on file     Non-medical: Not on file   Tobacco Use    Smoking status: Never Smoker    Smokeless tobacco: Never Used   Substance and Sexual Activity    Alcohol use: Yes     Comment: ocassionally     Drug use: No    Sexual activity: Yes     Partners: Male     Birth control/protection: None   Lifestyle    Physical activity     Days per week: Not on file     Minutes per session: Not on file    Stress: Not on file   Relationships    Social connections     Talks on phone: Not on file     Gets together: Not on file     Attends [employer] service: Not on file     Active member of club or organization: Not on file     Attends meetings of clubs or organizations: Not on file     Relationship status: Not on file    Intimate partner violence     Fear of current or ex partner: Not on file     Emotionally abused: Not on file     Physically abused: Not on file     Forced sexual activity: Not on file   Other Topics Concern    Not on file   Social History Narrative    Not on file       Current Outpatient Medications   Medication Sig    PROAIR HFA 90 mcg/actuation inhaler TAKE 2 PUFFS BY MOUTH EVERY 4 HOURS AS NEEDED    B.infantis-B.ani-B.long-B.bifi (PROBIOTIC 4X) 10-15 mg TbEC Take  by mouth.  multivitamin (ONE A DAY) tablet Take 1 Tab by mouth daily.  mometasone-formoterol (DULERA) 100-5 mcg/actuation HFA inhaler Take 2 Puffs by inhalation two (2) times a day.  Cetirizine 10 mg cap Take  by mouth.  ascorbic acid, vitamin C, (VITAMIN C) 250 mg tablet Take  by mouth.  triamcinolone acetonide (KENALOG) 0.1 % ointment Apply  to affected area three (3) times daily. use thin layer (Patient not taking: Reported on 9/15/2020)     No current facility-administered medications for this visit. Objective:     Vitals:    09/15/20 0833 09/15/20 0843   BP: (!) 142/81 139/82   Pulse: 73    Resp: 18    SpO2: 99%    Weight: 163 lb (73.9 kg)    Height: 5' 3\" (1.6 m)        Physical Examination:  General: Alert, cooperative, no distress, appears stated age. Eyes: Conjunctivae clear. PERRL, EOMs intact. Ears: Normal external ear canals both ears. TM clear and mobile bilaterally  Nose: Nares normal. Septum midline. Mucosa normal. No drainage or sinus tenderness. Mouth/Throat: Lips, mucosa, and tongue normal.   Neck: Supple, symmetrical, trachea midline, no adenopathy. No thyroid enlargement/tenderness/nodules  Back: Symmetric, no curvature. ROM normal.   Lungs: Clear to auscultation bilaterally. Normal inspiratory and expiratory ratio. Heart: Regular rate and rhythm, S1, S2 normal, no murmur, click, rub or gallop. Abdomen: Soft, non-tender. Bowel sounds normal. No masses or organomegaly. Extremities: Extremities normal, atraumatic, no cyanosis or edema. Pulses: 2+ and symmetric all extremities. Skin: Skin color, texture, turgor normal. No rashes or lesions on exposed skin. Lymph nodes: Cervical, supraclavicular nodes normal.  Neurologic: CNII-XII intact. Strength 5/5 grossly. Sensation and reflexes normal throughout. No visits with results within 3 Month(s) from this visit.    Latest known visit with results is: Office Visit on 10/22/2018   Component Date Value Ref Range Status    WBC 10/22/2018 4.1  3.4 - 10.8 x10E3/uL Final    RBC 10/22/2018 4.54  3.77 - 5.28 x10E6/uL Final    HGB 10/22/2018 13.5  11.1 - 15.9 g/dL Final    HCT 10/22/2018 39.6  34.0 - 46.6 % Final    MCV 10/22/2018 87  79 - 97 fL Final    MCH 10/22/2018 29.7  26.6 - 33.0 pg Final    MCHC 10/22/2018 34.1  31.5 - 35.7 g/dL Final    RDW 10/22/2018 14.2  12.3 - 15.4 % Final    PLATELET 98/67/1417 181  150 - 379 x10E3/uL Final    NEUTROPHILS 10/22/2018 60  Not Estab. % Final    Lymphocytes 10/22/2018 29  Not Estab. % Final    MONOCYTES 10/22/2018 9  Not Estab. % Final    EOSINOPHILS 10/22/2018 2  Not Estab. % Final    BASOPHILS 10/22/2018 0  Not Estab. % Final    ABS. NEUTROPHILS 10/22/2018 2.5  1.4 - 7.0 x10E3/uL Final    Abs Lymphocytes 10/22/2018 1.2  0.7 - 3.1 x10E3/uL Final    ABS. MONOCYTES 10/22/2018 0.4  0.1 - 0.9 x10E3/uL Final    ABS. EOSINOPHILS 10/22/2018 0.1  0.0 - 0.4 x10E3/uL Final    ABS. BASOPHILS 10/22/2018 0.0  0.0 - 0.2 x10E3/uL Final    IMMATURE GRANULOCYTES 10/22/2018 0  Not Estab. % Final    ABS. IMM. GRANS.  10/22/2018 0.0  0.0 - 0.1 x10E3/uL Final    Glucose 10/22/2018 89  65 - 99 mg/dL Final    BUN 10/22/2018 10  6 - 24 mg/dL Final    Creatinine 10/22/2018 1.01* 0.57 - 1.00 mg/dL Final    GFR est non-AA 10/22/2018 67  >59 mL/min/1.73 Final    GFR est AA 10/22/2018 78  >59 mL/min/1.73 Final    BUN/Creatinine ratio 10/22/2018 10  9 - 23 Final    Sodium 10/22/2018 140  134 - 144 mmol/L Final    Potassium 10/22/2018 4.4  3.5 - 5.2 mmol/L Final    Chloride 10/22/2018 102  96 - 106 mmol/L Final    CO2 10/22/2018 24  20 - 29 mmol/L Final    Calcium 10/22/2018 9.2  8.7 - 10.2 mg/dL Final    Protein, total 10/22/2018 6.5  6.0 - 8.5 g/dL Final    Albumin 10/22/2018 4.3  3.5 - 5.5 g/dL Final    GLOBULIN, TOTAL 10/22/2018 2.2  1.5 - 4.5 g/dL Final    A-G Ratio 10/22/2018 2.0  1.2 - 2.2 Final    Bilirubin, total 10/22/2018 0.3  0.0 - 1.2 mg/dL Final    Alk. phosphatase 10/22/2018 41  39 - 117 IU/L Final    AST (SGOT) 10/22/2018 15  0 - 40 IU/L Final    ALT (SGPT) 10/22/2018 15  0 - 32 IU/L Final    Cholesterol, total 10/22/2018 217* 100 - 199 mg/dL Final    Triglyceride 10/22/2018 68  0 - 149 mg/dL Final    HDL Cholesterol 10/22/2018 62  >39 mg/dL Final    VLDL, calculated 10/22/2018 14  5 - 40 mg/dL Final    LDL, calculated 10/22/2018 141* 0 - 99 mg/dL Final    Hemoglobin A1c 10/22/2018 5.6  4.8 - 5.6 % Final    Comment:          Prediabetes: 5.7 - 6.4           Diabetes: >6.4           Glycemic control for adults with diabetes: <7.0      Estimated average glucose 10/22/2018 114  mg/dL Final    INTERPRETATION 10/22/2018 Note   Final    Comment: Medical Director's Note: Patient Last Name has been  corrected on 10/23/2018, was TRINA and now is Suma Delgado. Please review this report in its entirety, since  changes to patient demographics may affect result  interpretation(s) and/or treatment/follow-up suggestions. Supplemental report is available. Assessment/ Plan:   Diagnoses and all orders for this visit:    1. Well woman exam (no gynecological exam)  -     CBC WITH AUTOMATED DIFF  -     METABOLIC PANEL, COMPREHENSIVE  -     LIPID PANEL    2. History of prediabetes  -     HEMOGLOBIN A1C WITH EAG    3. Irregular periods  -     FSH AND LH  -     TESTOSTERONE, TOTAL, FEMALE/CHILD  -     CBC WITH AUTOMATED DIFF  -     TSH REFLEX TO T4    4. Overweight    5. Encounter for screening mammogram for malignant neoplasm of breast  -     [facility] 3D NI W MAMMO BI SCREENING INCL CAD; Future    6.  Encounter for immunization  -     PNEUMOCOCCAL POLYSACCHARIDE VACCINE, 23-VALENT, ADULT OR IMMUNOSUPPRESSED PT DOSE,  -     [place] IMMUNIZ ADMIN,1 SINGLE/COMB VAC/TOXOID      For today's visit, I did the following:  · Reviewed PMH as listed in orders  · Reviewed labs in detail or ordered lab  Labs to eval end organ function and etiology of chronic/acute concerns. Relevant vaccine, cancer screening and other health maintenance reviewed and updated per orders. - prefers flu shot in 1 month  Irregular periods, labs to evaluate. Diet and lifestyle modification encouraged for weight loss and chronic disease prevention/ management. Negative depression screen. Follow up with specialists per routine. Educated patient on red flag symptoms to warrant return to clinic or emergency room visit. I have discussed the diagnosis with the patient and the intended plan as seen in the above orders. The patient has been offered or received an after-visit summary and questions were answered concerning future plans. I have discussed medication side effects and warnings with the patient as well.     Follow-up and Dispositions    · Return in about 1 year (around 9/15/2021) for Physical exam.         Signed,    Isra Cabezas MD  9/15/2020

## 2020-09-15 NOTE — PROGRESS NOTES
Identified pt with two pt identifiers(name and ). Reviewed record in preparation for visit and have obtained necessary documentation. Chief Complaint   Patient presents with    Complete Physical     Pt states she would not like to get a pap smear        Health Maintenance Due   Topic    Pneumococcal 0-64 years (1 of 1 - PPSV23)    A1C test (Diabetic or Prediabetic)     Flu Vaccine (1)     Pneumococcal: Pt states she already got this vaccine  Flu vaccine: Pt would like to get it beginning of October    Visit Vitals  /82   Pulse 73   Resp 18   Ht 5' 3\" (1.6 m)   Wt 163 lb (73.9 kg)   SpO2 99%   BMI 28.87 kg/m²         Coordination of Care Questionnaire:  :   1) Have you been to an emergency room, urgent care, or hospitalized since your last visit? If yes, where when, and reason for visit? NO       2. Have seen or consulted any other health care provider since your last visit? If yes, where when, and reason for visit? NO      Patient is accompanied by self I have received verbal consent from [address] [place] [address] to discuss any/all medical information while they are present in the room.

## 2020-09-15 NOTE — PATIENT INSTRUCTIONS
Weight Loss Tips:  Remember this is like a part time job so your motivation and commitment is key to your success. Mindset  Weight loss like any other behavior change starts in your mind. Think hard about what your motivates you to lose weight then meditate on that. Remind yourself of your motivation  with phone alarms, scheduled meditation time, vision board, journal- just to name a few ideas. Have realistic goals. We expect with diligent healthy diet and physical activity you can lose 5% of your body weight in 3  months. Wt in lbs x 0.05 = #lbs you should lose in 3 months. Make food and activity changes with a goal of CONSISTENCY not perfection. Food  Start eating differently. Most of your weight loss and gain is from what you eat. Use small plates only  Drink 2 liters (1/2 gallon) of water every day  HALF of every meal should be fruit or vegetables  Try meal prepping on Sunday (or your day off) with new different vegetables. Consider meal prep service such as Cleaneatz[employer], wepremeals. com  Replace soda with diet soda or other zero sugar drinks (selter water just fine)  Consider using the [employer] laura for calorie counting. Goal 9103-1249 calories per day    Activity  Staying physically active will help you lose more weight and can help you get over the plateau when you weight just  won't change any more with diet. Start exercise at least 5 days per week for 40 minutes. Consider Socrative training laura for home exercises. You can start with walking. I suggest walking at a speed of at least 3.5-4.5mph to for the weight loss benefit. Increase your speed or distance every 2 weeks. Do some slow stretching daily of legs, arms and back. Consider adding weight training with light weights at home or at the gym. See a doctor or a physical training for  instructions in order to avoid injuries from doing muscle training incorrectly.   Free fitness program in [place]: [employer] org/program/fitness-warriors/         A Healthy Lifestyle: Care Instructions  Your Care Instructions     A healthy lifestyle can help you feel good, stay at a healthy weight, and have plenty of energy for both work and play. A healthy lifestyle is something you can share with your whole family. A healthy lifestyle also can lower your risk for serious health problems, such as high blood pressure, heart disease, and diabetes. You can follow a few steps listed below to improve your health and the health of your family. Follow-up care is a key part of your treatment and safety. Be sure to make and go to all appointments, and call your doctor if you are having problems. It's also a good idea to know your test results and keep a list of the medicines you take. How can you care for yourself at home? · Do not eat too much sugar, fat, or fast foods. You can still have dessert and treats now and then. The goal is moderation. · Start small to improve your eating habits. Pay attention to portion sizes, drink less juice and soda pop, and eat more fruits and vegetables. ? Eat a healthy amount of food. A 3-ounce serving of meat, for example, is about the size of a deck of cards. Fill the rest of your plate with vegetables and whole grains. ? Limit the amount of soda and sports drinks you have every day. Drink more water when you are thirsty. ? Eat at least 5 servings of fruits and vegetables every day. It may seem like a lot, but it is not hard to reach this goal. A serving or helping is 1 piece of fruit, 1 cup of vegetables, or 2 cups of leafy, raw vegetables. Have an apple or some carrot sticks as an afternoon snack instead of a candy bar. Try to have fruits and/or vegetables at every meal.  · Make exercise part of your daily routine. You may want to start with simple activities, such as walking, bicycling, or slow swimming. Try to be active 30 to 60 minutes every day.  You do not need to do all 30 to 60 minutes all at once. For example, you can exercise 3 times a day for 10 or 20 minutes. Moderate exercise is safe for most people, but it is always a good idea to talk to your doctor before starting an exercise program.  · Keep moving. Lis Josefa the lawn, work in the garden, or [employer]. Take the stairs instead of the elevator at work. · If you smoke, quit. People who smoke have an increased risk for heart attack, stroke, cancer, and other lung illnesses. Quitting is hard, but there are ways to boost your chance of quitting tobacco for good. ? Use nicotine gum, patches, or lozenges. ? Ask your doctor about stop-smoking programs and medicines. ? Keep trying. In addition to reducing your risk of diseases in the future, you will notice some benefits soon after you stop using tobacco. If you have shortness of breath or asthma symptoms, they will likely get better within a few weeks after you quit. · Limit how much alcohol you drink. Moderate amounts of alcohol (up to 2 drinks a day for men, 1 drink a day for women) are okay. But drinking too much can lead to liver problems, high blood pressure, and other health problems. Family health  If you have a family, there are many things you can do together to improve your health. · Eat meals together as a family as often as possible. · Eat healthy foods. This includes fruits, vegetables, lean meats and dairy, and whole grains. · Include your family in your fitness plan. Most people think of activities such as jogging or tennis as the way to fitness, but there are many ways you and your family can be more active. Anything that makes you breathe hard and gets your heart pumping is exercise. Here are some tips:  ? Walk to do errands or to take your child to school or the bus.  ? Go for a family bike ride after dinner instead of watching TV. Where can you learn more?   Go to http://linsey-darlene.info/  Enter W841 in the search box to learn more about \"A Healthy Lifestyle: Care Instructions. \"  Current as of: January 31, 2020               Content Version: 12.6  © 7259-5626 [employer]. Care instructions adapted under license by [employer] (which disclaims liability or warranty for this information). If you have questions about a medical condition or this instruction, always ask your healthcare professional. [address] any warranty or liability for your use of this information.

## 2021-03-26 ENCOUNTER — TELEPHONE (OUTPATIENT)
Dept: FAMILY MEDICINE CLINIC | Age: 46
End: 2021-03-26

## 2021-03-26 NOTE — TELEPHONE ENCOUNTER
Patient call was given referral for a mammogram on last September that she never went. Wants to know if she can use that same referral to have one done now.     Requesting a call back    Best call back # [phone]

## 2021-03-30 NOTE — TELEPHONE ENCOUNTER
Outbound call to patient. Patient made aware that can still use mammogram referral that was given. Patient verbalized understanding.

## 2021-04-13 ENCOUNTER — TRANSCRIBE ORDER (OUTPATIENT)
Dept: SCHEDULING | Age: 46
End: 2021-04-13

## 2021-04-13 DIAGNOSIS — Z12.31 SCREENING MAMMOGRAM FOR HIGH-RISK PATIENT: Primary | ICD-10-CM

## 2021-05-04 ENCOUNTER — HOSPITAL ENCOUNTER (OUTPATIENT)
Dept: MAMMOGRAPHY | Age: 46
Discharge: HOME | End: 2021-05-04
Payer: COMMERCIAL

## 2021-05-04 DIAGNOSIS — Z12.31 SCREENING MAMMOGRAM FOR HIGH-RISK PATIENT: ICD-10-CM

## 2021-05-04 PROCEDURE — 77067 SCR MAMMO BI INCL CAD: CPT

## 2022-03-19 PROBLEM — E66.3 OVERWEIGHT: Status: ACTIVE | Noted: 2018-10-22

## 2022-07-14 ENCOUNTER — OFFICE VISIT (OUTPATIENT)
Dept: INTERNAL MEDICINE CLINIC | Age: 47
End: 2022-07-14
Payer: COMMERCIAL

## 2022-07-14 VITALS
SYSTOLIC BLOOD PRESSURE: 122 MMHG | HEIGHT: 63 IN | HEART RATE: 89 BPM | TEMPERATURE: 98.5 F | DIASTOLIC BLOOD PRESSURE: 78 MMHG | RESPIRATION RATE: 18 BRPM | OXYGEN SATURATION: 99 % | WEIGHT: 169.4 LBS | BODY MASS INDEX: 30.02 KG/M2

## 2022-07-14 DIAGNOSIS — Z12.4 PAP SMEAR FOR CERVICAL CANCER SCREENING: ICD-10-CM

## 2022-07-14 DIAGNOSIS — Z11.59 NEED FOR HEPATITIS C SCREENING TEST: ICD-10-CM

## 2022-07-14 DIAGNOSIS — E66.3 OVERWEIGHT: ICD-10-CM

## 2022-07-14 DIAGNOSIS — J45.20 MILD INTERMITTENT ASTHMA WITHOUT COMPLICATION: ICD-10-CM

## 2022-07-14 DIAGNOSIS — Z83.3 FAMILY HISTORY OF DIABETES MELLITUS: ICD-10-CM

## 2022-07-14 DIAGNOSIS — Z00.00 WELL ADULT EXAM: Primary | ICD-10-CM

## 2022-07-14 PROCEDURE — 99203 OFFICE O/P NEW LOW 30 MIN: CPT | Performed by: INTERNAL MEDICINE

## 2022-07-14 NOTE — PROGRESS NOTES
Rm    Chief Complaint   Patient presents with    New Patient     est care        Visit Vitals  /78   Pulse 89   Temp 98.5 °F (36.9 °C) (Oral)   Resp 18   Ht 5' 3\" (1.6 m)   Wt 169 lb 6.4 oz (76.8 kg)   LMP 06/24/2022   SpO2 99%   BMI 30.01 kg/m²        1. Have you been to the ER, urgent care clinic since your last visit? Hospitalized since your last visit? No    2. Have you seen or consulted any other health care providers outside of the [address] [place] [address] since your last visit? Include any pap smears or colon screening. No     Health Maintenance Due   Topic Date Due    Hepatitis C Screening  Never done    COVID-19 Vaccine (1) Never done    Colorectal Cancer Screening Combo  Never done    A1C test (Diabetic or Prediabetic)  10/22/2019    Depression Screen  09/15/2021    Pneumococcal 0-64 years (2 - PCV) 09/15/2021        3 most recent PHQ Screens 7/14/2022   Little interest or pleasure in doing things Not at all   Feeling down, depressed, irritable, or hopeless Not at all   Total Score PHQ 2 0        No flowsheet data found. No flowsheet data found.

## 2022-07-14 NOTE — PROGRESS NOTES
HISTORY OF PRESENT ILLNESS  Yohannes Lin is a 50 y.o. female. Patient was seen to establish care at this location. PMH of elevated a1c, overweight, allergies and asthma. Denies any concerns today. Sees an allergist. Has had injections to help in the past  PAP was done back in 2018. Mammogram was last spring. Will need a colon screening. Denies any Family or self history with GI concerns. Will also need lab work. COVNIKA sepulveda   Gets 3 meals a day and more water than any other drink. Visit Vitals  /78   Pulse 89   Temp 98.5 °F (36.9 °C) (Oral)   Resp 18   Ht 5' 3\" (1.6 m)   Wt 169 lb 6.4 oz (76.8 kg)   LMP 06/24/2022   SpO2 99%   BMI 30.01 kg/m²     Past Medical History:   Diagnosis Date    Asthma     Eczema     H/O seasonal allergies      Past Surgical History:   Procedure Laterality Date    HX WISDOM TEETH EXTRACTION  12years old      Family History   Problem Relation Age of Onset    Hypertension Mother     Diabetes Father     Diabetes Maternal Grandmother     Hypertension Maternal Grandmother     Stroke Maternal Grandmother     Cancer Maternal Grandfather         lung-heavy smoker     Diabetes Maternal Grandfather     Hypertension Maternal Grandfather     Diabetes Paternal Grandmother     Hypertension Paternal Grandmother     Stroke Paternal Grandmother     Diabetes Paternal Grandfather     Hypertension Paternal Grandfather      Outpatient Encounter Medications as of 7/14/2022   Medication Sig Dispense Refill    PROAIR HFA 90 mcg/actuation inhaler TAKE 2 PUFFS BY MOUTH EVERY 4 HOURS AS NEEDED  6    B.infantis-B.ani-B.long-B.bifi (PROBIOTIC 4X) 10-15 mg TbEC Take  by mouth.  ascorbic acid, vitamin C, (VITAMIN C) 250 mg tablet Take  by mouth.  multivitamin (ONE A DAY) tablet Take 1 Tab by mouth daily.  mometasone-formoterol (DULERA) 100-5 mcg/actuation HFA inhaler Take 2 Puffs by inhalation two (2) times a day.  Cetirizine 10 mg cap Take  by mouth.       triamcinolone acetonide (KENALOG) 0.1 % ointment Apply  to affected area three (3) times daily. use thin layer 30 g 3     No facility-administered encounter medications on file as of 7/14/2022. HPI    Review of Systems   All other systems reviewed and are negative. Physical Exam  Vitals and nursing note reviewed. HENT:      Head: Normocephalic. Eyes:      Pupils: Pupils are equal, round, and reactive to light. Cardiovascular:      Rate and Rhythm: Normal rate and regular rhythm. Pulmonary:      Effort: Pulmonary effort is normal.      Breath sounds: Normal breath sounds. Abdominal:      General: Bowel sounds are normal.      Palpations: Abdomen is soft. Musculoskeletal:         General: Normal range of motion. Cervical back: Neck supple. Skin:     General: Skin is warm. Neurological:      Mental Status: She is alert and oriented to person, place, and time. Psychiatric:         Behavior: Behavior normal.         ASSESSMENT and PLAN  Diagnoses and all orders for this visit:    1. Well adult exam  -     METABOLIC PANEL, COMPREHENSIVE; Future  -     CBC WITH AUTOMATED DIFF; Future  -     LIPID PANEL; Future    2. Mild intermittent asthma without complication    3. Pap smear for cervical cancer screening  -     REFERRAL TO GYNECOLOGY    4. Family history of diabetes mellitus  -     HEMOGLOBIN A1C WITH EAG; Future    5. Overweight    6. Need for hepatitis C screening test  -     HEPATITIS C AB;  Future      Follow-up and Dispositions    · Return in about 1 year (around 7/14/2023), or if symptoms worsen or fail to improve.       lab results and schedule of future lab studies reviewed with patient  reviewed diet, exercise and weight control  cardiovascular risk and specific lipid/LDL goals reviewed  reviewed medications and side effects in detail

## 2023-05-19 RX ORDER — ASCORBIC ACID 250 MG
TABLET ORAL
COMMUNITY
End: 2023-06-20

## 2023-05-19 RX ORDER — ALBUTEROL SULFATE 90 UG/1
AEROSOL, METERED RESPIRATORY (INHALATION)
COMMUNITY
Start: 2018-09-04

## 2023-06-19 SDOH — ECONOMIC STABILITY: FOOD INSECURITY: WITHIN THE PAST 12 MONTHS, THE FOOD YOU BOUGHT JUST DIDN'T LAST AND YOU DIDN'T HAVE MONEY TO GET MORE.: NEVER TRUE

## 2023-06-19 SDOH — ECONOMIC STABILITY: HOUSING INSECURITY
IN THE LAST 12 MONTHS, WAS THERE A TIME WHEN YOU DID NOT HAVE A STEADY PLACE TO SLEEP OR SLEPT IN A SHELTER (INCLUDING NOW)?: NO

## 2023-06-19 SDOH — ECONOMIC STABILITY: FOOD INSECURITY: WITHIN THE PAST 12 MONTHS, YOU WORRIED THAT YOUR FOOD WOULD RUN OUT BEFORE YOU GOT MONEY TO BUY MORE.: NEVER TRUE

## 2023-06-19 SDOH — ECONOMIC STABILITY: INCOME INSECURITY: HOW HARD IS IT FOR YOU TO PAY FOR THE VERY BASICS LIKE FOOD, HOUSING, MEDICAL CARE, AND HEATING?: NOT HARD AT ALL

## 2023-06-19 SDOH — ECONOMIC STABILITY: TRANSPORTATION INSECURITY
IN THE PAST 12 MONTHS, HAS LACK OF TRANSPORTATION KEPT YOU FROM MEETINGS, WORK, OR FROM GETTING THINGS NEEDED FOR DAILY LIVING?: NO

## 2023-06-20 ENCOUNTER — OFFICE VISIT (OUTPATIENT)
Age: 48
End: 2023-06-20
Payer: COMMERCIAL

## 2023-06-20 VITALS
RESPIRATION RATE: 12 BRPM | BODY MASS INDEX: 28 KG/M2 | OXYGEN SATURATION: 98 % | HEIGHT: 63 IN | DIASTOLIC BLOOD PRESSURE: 80 MMHG | WEIGHT: 158 LBS | HEART RATE: 78 BPM | SYSTOLIC BLOOD PRESSURE: 116 MMHG | TEMPERATURE: 98 F

## 2023-06-20 DIAGNOSIS — Z12.31 BREAST CANCER SCREENING BY MAMMOGRAM: ICD-10-CM

## 2023-06-20 DIAGNOSIS — Z12.11 COLON CANCER SCREENING: ICD-10-CM

## 2023-06-20 DIAGNOSIS — Z86.39 H/O MIXED HYPERLIPIDEMIA: ICD-10-CM

## 2023-06-20 DIAGNOSIS — Z00.00 WELL ADULT EXAM: Primary | ICD-10-CM

## 2023-06-20 PROCEDURE — 99213 OFFICE O/P EST LOW 20 MIN: CPT | Performed by: INTERNAL MEDICINE

## 2023-06-20 ASSESSMENT — PATIENT HEALTH QUESTIONNAIRE - PHQ9
SUM OF ALL RESPONSES TO PHQ9 QUESTIONS 1 & 2: 0
2. FEELING DOWN, DEPRESSED OR HOPELESS: 0
1. LITTLE INTEREST OR PLEASURE IN DOING THINGS: 0
SUM OF ALL RESPONSES TO PHQ QUESTIONS 1-9: 0

## 2023-06-20 ASSESSMENT — ANXIETY QUESTIONNAIRES
7. FEELING AFRAID AS IF SOMETHING AWFUL MIGHT HAPPEN: 0
5. BEING SO RESTLESS THAT IT IS HARD TO SIT STILL: 0
IF YOU CHECKED OFF ANY PROBLEMS ON THIS QUESTIONNAIRE, HOW DIFFICULT HAVE THESE PROBLEMS MADE IT FOR YOU TO DO YOUR WORK, TAKE CARE OF THINGS AT HOME, OR GET ALONG WITH OTHER PEOPLE: NOT DIFFICULT AT ALL
4. TROUBLE RELAXING: 0
2. NOT BEING ABLE TO STOP OR CONTROL WORRYING: 0
6. BECOMING EASILY ANNOYED OR IRRITABLE: 0
1. FEELING NERVOUS, ANXIOUS, OR ON EDGE: 0
3. WORRYING TOO MUCH ABOUT DIFFERENT THINGS: 0
GAD7 TOTAL SCORE: 0

## 2023-06-20 ASSESSMENT — ENCOUNTER SYMPTOMS
GASTROINTESTINAL NEGATIVE: 1
RESPIRATORY NEGATIVE: 1

## 2023-06-20 NOTE — PROGRESS NOTES
1. \"Have you been to the ER, urgent care clinic since your last visit? Hospitalized since your last visit? \"  6/6/23 for sinus issues    2. \"Have you seen or consulted any other health care providers outside of the [address] [place] [address] since your last visit? \"  carenow    3. For patients aged 39-70: Has the patient had a colonoscopy / FIT/ Cologuard? Recommendation: Colonoscopy every 10y or annual FIT test from 50-75 or every 3 year stool DNA based test with consideration of ongoing screening from 76-85. and No      If the patient is female:    4. For patients aged 41-77: Has the patient had a mammogram within the past 2 years? Yes    5. For patients aged 21-65: Has the patient had a pap smear?  Yes and No  Scheduled for august 2023

## 2023-06-20 NOTE — PROGRESS NOTES
Subjective    Calixto Schmitt is a 50 y.o. female who presents today for the following: patient was seen today for her well exam. Looking to maybe change jobs soon. Reports that she overall has been good. Having some hot flashes at times. Is still have a period every 28 days most months. Lab work was reviewed that was done in the last week. Lipids were slightly elevated     Will need mammogram and colon screen done. Denies any current concerns at this time. PAP done in the last 2 years. Chief Complaint   Patient presents with    Follow-up    Cough    Discuss Labs           PMH/PSH/Allergies/Social History/medication list and most recent studies reviewed with patient. reports that she has never smoked. She has never used smokeless tobacco.    reports that she does not currently use alcohol. Vitals:    06/20/23 0904   BP: 116/80   Pulse: 78   Resp: 12   Temp: 98 °F (36.7 °C)   SpO2: 98%      Past Medical History:   Diagnosis Date    Asthma     Eczema     H/O seasonal allergies        Allergies   Allergen Reactions    Latex Rash    Cat Hair Extract Shortness Of Breath    Avocado Hives     Wheezing     Codeine Other (See Comments)     Migraine     Tomato      Other reaction(s): Runny Nose        Current Outpatient Medications on File Prior to Visit   Medication Sig Dispense Refill    Multiple Vitamins-Minerals (MULTIVITAMIN ADULTS PO) Take 1 tablet by mouth daily      albuterol sulfate HFA (PROVENTIL;VENTOLIN;PROAIR) 108 (90 Base) MCG/ACT inhaler TAKE 2 PUFFS BY MOUTH EVERY 4 HOURS AS NEEDED      Cetirizine HCl 10 MG CAPS Take by mouth      mometasone-formoterol (DULERA) 100-5 MCG/ACT inhaler Inhale 2 puffs into the lungs 2 times daily      triamcinolone (KENALOG) 0.1 % ointment Apply topically 3 times daily       No current facility-administered medications on file prior to visit. Review of Systems   Constitutional:  Negative for fatigue and fever. Respiratory: Negative.

## 2023-07-18 ENCOUNTER — HOSPITAL ENCOUNTER (OUTPATIENT)
Facility: HOSPITAL | Age: 48
Discharge: HOME | End: 2023-07-21
Payer: COMMERCIAL

## 2023-07-18 DIAGNOSIS — Z12.31 VISIT FOR SCREENING MAMMOGRAM: ICD-10-CM

## 2023-07-18 PROCEDURE — 77063 BREAST TOMOSYNTHESIS BI: CPT

## 2023-12-07 ENCOUNTER — HOSPITAL ENCOUNTER (OUTPATIENT)
Facility: HOSPITAL | Age: 48
Discharge: HOME | End: 2023-12-07
Payer: COMMERCIAL

## 2023-12-07 ENCOUNTER — OFFICE VISIT (OUTPATIENT)
Age: 48
End: 2023-12-07
Payer: COMMERCIAL

## 2023-12-07 ENCOUNTER — PATIENT MESSAGE (OUTPATIENT)
Age: 48
End: 2023-12-07

## 2023-12-07 VITALS
TEMPERATURE: 98.1 F | SYSTOLIC BLOOD PRESSURE: 104 MMHG | WEIGHT: 165.4 LBS | DIASTOLIC BLOOD PRESSURE: 68 MMHG | BODY MASS INDEX: 29.3 KG/M2 | HEIGHT: 63 IN

## 2023-12-07 DIAGNOSIS — M25.562 ACUTE PAIN OF LEFT KNEE: Primary | ICD-10-CM

## 2023-12-07 DIAGNOSIS — M25.562 ACUTE PAIN OF LEFT KNEE: ICD-10-CM

## 2023-12-07 PROCEDURE — 99213 OFFICE O/P EST LOW 20 MIN: CPT | Performed by: INTERNAL MEDICINE

## 2023-12-07 PROCEDURE — 73562 X-RAY EXAM OF KNEE 3: CPT

## 2023-12-07 ASSESSMENT — PATIENT HEALTH QUESTIONNAIRE - PHQ9
1. LITTLE INTEREST OR PLEASURE IN DOING THINGS: 0
2. FEELING DOWN, DEPRESSED OR HOPELESS: 0
SUM OF ALL RESPONSES TO PHQ QUESTIONS 1-9: 0
SUM OF ALL RESPONSES TO PHQ9 QUESTIONS 1 & 2: 0
SUM OF ALL RESPONSES TO PHQ QUESTIONS 1-9: 0

## 2023-12-07 ASSESSMENT — ENCOUNTER SYMPTOMS: RESPIRATORY NEGATIVE: 1

## 2023-12-07 NOTE — PROGRESS NOTES
Health Maintenance Due   Topic Date Due    Hepatitis B vaccine (1 of 3 - 3-dose series) Never done    COVID-19 Vaccine (1) Never done    HIV screen  Never done    Pneumococcal 0-64 years Vaccine (2 - PCV) 09/15/2021    Shingles vaccine (1 of 2) Never done    Flu vaccine (1) Never done    Cervical cancer screen  10/22/2023       No chief complaint on file. 1. Have you been to the ER, urgent care clinic since your last visit? Hospitalized since your last visit? Urgent care. Care now    2. Have you seen or consulted any other health care providers outside of the [address] [place] [address] since your last visit? Include any pap smears or colon screening. No    3) Do you have an Advance Directive on file? No    4) Are you interested in receiving information on Advance Directives? No      Patient is accompanied by self I have received verbal consent from [address] to discuss any/all medical information while they are present in the room.

## 2023-12-21 ENCOUNTER — TELEPHONE (OUTPATIENT)
Age: 48
End: 2023-12-21

## 2023-12-21 DIAGNOSIS — M25.562 ACUTE PAIN OF LEFT KNEE: Primary | ICD-10-CM

## 2023-12-21 NOTE — TELEPHONE ENCOUNTER
----- Message from Kiarra De La Rosa sent at 12/21/2023  8:59 AM EST -----  Subject: Referral Request    Reason for referral request? orthopedic specialists for left knee.   Provider patient wants to be referred to(if known):     Provider Phone Number(if known):    Additional Information for Provider? patient would like a referral to   ortho, please contact patient to advise when order is in.   ---------------------------------------------------------------------------  --------------  CALL BACK INFO    [phone]; OK to leave message on voicemail  ---------------------------------------------------------------------------  --------------

## 2024-01-09 ENCOUNTER — TELEPHONE (OUTPATIENT)
Age: 49
End: 2024-01-09

## 2024-01-09 NOTE — TELEPHONE ENCOUNTER
----- Message from Rocio Barrett sent at 1/9/2024  1:23 PM EST -----  Subject: Message to Provider    QUESTIONS  Information for Provider? pt was calling in because ortho referral has not   reached out to her yet, please advise if there is anything that the office   might be able to help with, i did give her the number  ---------------------------------------------------------------------------  --------------  CALL BACK INFO  [phone]; OK to leave message on voicemail  ---------------------------------------------------------------------------  --------------  SCRIPT ANSWERS  Relationship to Patient? Self

## 2024-01-24 ENCOUNTER — HOSPITAL ENCOUNTER (OUTPATIENT)
Facility: HOSPITAL | Age: 49
Setting detail: RECURRING SERIES
Discharge: HOME | End: 2024-01-27
Payer: COMMERCIAL

## 2024-01-24 PROCEDURE — 97140 MANUAL THERAPY 1/> REGIONS: CPT | Performed by: PHYSICAL THERAPIST

## 2024-01-24 PROCEDURE — 97162 PT EVAL MOD COMPLEX 30 MIN: CPT | Performed by: PHYSICAL THERAPIST

## 2024-01-24 PROCEDURE — 97110 THERAPEUTIC EXERCISES: CPT | Performed by: PHYSICAL THERAPIST

## 2024-01-24 NOTE — THERAPY EVALUATION
abilities    Treatment Plan may include any combination of the followin Therapeutic Exercise, 85996 Neuromuscular Re-Education, 58020 Manual Therapy, 52947 Therapeutic Activity, 01844 Self Care/Home Management, 56190 Electrical Stim unattended, 47033 Vasopneumatic Device  (Vasopnuematic compression justification:  Per bilateral girth measures taken and listed above the edema is considered significant and having an impact on the patient's strength, balance, gait, transfers, self care, and ADL's), and 98699 Gait Training  Patient / Family readiness to learn indicated by: asking questions, trying to perform skills, and interest  Persons(s) to be included in education: patient (P)  Barriers to Learning/Limitations: none  Measures taken if barriers to learning present: N/A  Patient Self Reported Health Status: good  Rehabilitation Potential: good    Short Term Goals: To be accomplished in 4-6 treatments.  Pt will be ind with HEP.  Pt will have 0 degrees L knee extension for improved gait mechanics.  Long Term Goals: To be accomplished in 16 treatments.  Pt will perform squat with good form and no increased pain.  Pt will have 4+/5 L hip abd and extension strength for improved stability at knee with ambulation.  Pt will improve FOTO score from 63/100 to at least 71/100 for improved function.  Pt will be able to descend hills on walks without increased pain or feelings of instability.    Frequency / Duration: Patient to be seen 2 times per week for 16 treatments.    Patient/ Caregiver education and instruction: Diagnosis, prognosis, self care, activity modification, and exercises   [x]  Plan of care has been reviewed with SAUL Wick, PT       2024       9:17 AM        ===================================================================  I certify that the above Therapy Services are being furnished while the patient is under my care. I agree with the treatment plan and certify that this therapy is

## 2024-01-30 ENCOUNTER — HOSPITAL ENCOUNTER (OUTPATIENT)
Facility: HOSPITAL | Age: 49
Setting detail: RECURRING SERIES
Discharge: HOME | End: 2024-02-02
Payer: COMMERCIAL

## 2024-01-30 PROCEDURE — 97140 MANUAL THERAPY 1/> REGIONS: CPT

## 2024-01-30 PROCEDURE — 97110 THERAPEUTIC EXERCISES: CPT

## 2024-01-30 NOTE — PROGRESS NOTES
PHYSICAL THERAPY - MEDICARE DAILY TREATMENT NOTE (updated 3/23)      Date: 2024          Patient Name:  Evy Myrick :  1975   Medical   Diagnosis:  Left knee pain [M25.562] Treatment Diagnosis:  M25.562  LEFT KNEE PAIN    Referral Source:  Cali Spencer MD Insurance:   [facility]or: [facility] / Plan: [facility] / Product Type: *No Product type* /                     Patient  verified yes     Visit #   Current  / Total 2 16   Time   In / Out 7:30 AM 8:30 AM   Total Treatment Time 60   Total Timed Codes 50   1:1 Treatment Time 30       BC Totals Reminder:  bill using total billable   min of TIMED therapeutic procedures and modalities.   8-22 min = 1 unit; 23-37 min = 2 units; 38-52 min = 3 units; 53-67 min = 4 units; 68-82 min = 5 units            SUBJECTIVE    Pain Level (0-10 scale): 0-1    Any medication changes, allergies to medications, adverse drug reactions, diagnosis change, or new procedure performed?: [x] No    [] Yes (see summary sheet for update)  Medications: Verified on Patient Summary List    Subjective functional status/changes:     Patient reports compliance with HEP, thought the KT tape was helpful. States she is a little sore today but overall doing well.     OBJECTIVE      Therapeutic Procedures:  Tx Min Billable or 1:1 Min (if diff from Tx Min) Procedure, Rationale, Specifics   35 15 64775 Therapeutic Exercise (timed):  increase ROM, strength, coordination, balance, and proprioception to improve patient's ability to progress to PLOF and address remaining functional goals. (see flow sheet as applicable)     Details if applicable:     15 15 07984 Manual Therapy (timed):  decrease pain, increase tissue extensibility, and decrease trigger points to improve patient's ability to progress to PLOF and address remaining functional goals.  The manual therapy interventions were performed at a separate and distinct time from the therapeutic activities interventions . (see flow

## 2024-02-01 ENCOUNTER — HOSPITAL ENCOUNTER (OUTPATIENT)
Facility: HOSPITAL | Age: 49
Setting detail: RECURRING SERIES
Discharge: HOME | End: 2024-02-04
Payer: COMMERCIAL

## 2024-02-01 PROCEDURE — 97140 MANUAL THERAPY 1/> REGIONS: CPT

## 2024-02-01 PROCEDURE — 97016 VASOPNEUMATIC DEVICE THERAPY: CPT

## 2024-02-01 PROCEDURE — 97110 THERAPEUTIC EXERCISES: CPT

## 2024-02-01 NOTE — PROGRESS NOTES
PHYSICAL THERAPY - MEDICARE DAILY TREATMENT NOTE (updated 3/23)      Date: 2024          Patient Name:  Evy Myrick :  1975   Medical   Diagnosis:  Left knee pain [M25.562] Treatment Diagnosis:  M25.562  LEFT KNEE PAIN    Referral Source:  Cali Spencer MD Insurance:   Payor: [facility] / Plan: [facility] / Product Type: *No Product type* /                     Patient  verified yes     Visit #   Current  / Total 3 16   Time   In / Out 7:30 AM 8:43 AM   Total Treatment Time 73   Total Timed Codes 63   1:1 Treatment Time 50      [facility] Totals Reminder:  bill using total billable   min of TIMED therapeutic procedures and modalities.   8-22 min = 1 unit; 23-37 min = 2 units; 38-52 min = 3 units; 53-67 min = 4 units; 68-82 min = 5 units            SUBJECTIVE    Pain Level (0-10 scale): \"discomfort\"    Any medication changes, allergies to medications, adverse drug reactions, diagnosis change, or new procedure performed?: [x] No    [] Yes (see summary sheet for update)  Medications: Verified on Patient Summary List    Subjective functional status/changes:     Patient reports some tightness along the lateral aspect of her L knee and  \"crunch\" just below her knee cap.     OBJECTIVE      Therapeutic Procedures:  Tx Min Billable or 1:1 Min (if diff from Tx Min) Procedure, Rationale, Specifics   48 32 84112 Therapeutic Exercise (timed):  increase ROM, strength, coordination, balance, and proprioception to improve patient's ability to progress to PLOF and address remaining functional goals. (see flow sheet as applicable)     Details if applicable:     15 15 63938 Manual Therapy (timed):  decrease pain, increase tissue extensibility, and decrease trigger points to improve patient's ability to progress to PLOF and address remaining functional goals.  The manual therapy interventions were performed at a separate and distinct time from the therapeutic activities interventions . (see flow sheet as

## 2024-02-06 ENCOUNTER — HOSPITAL ENCOUNTER (OUTPATIENT)
Facility: HOSPITAL | Age: 49
Setting detail: RECURRING SERIES
End: 2024-02-06
Payer: COMMERCIAL

## 2024-02-08 ENCOUNTER — APPOINTMENT (OUTPATIENT)
Facility: HOSPITAL | Age: 49
End: 2024-02-08
Payer: COMMERCIAL

## 2024-02-09 ENCOUNTER — HOSPITAL ENCOUNTER (OUTPATIENT)
Facility: HOSPITAL | Age: 49
Setting detail: RECURRING SERIES
Discharge: HOME | End: 2024-02-12
Payer: COMMERCIAL

## 2024-02-09 PROCEDURE — 97016 VASOPNEUMATIC DEVICE THERAPY: CPT

## 2024-02-09 PROCEDURE — 97110 THERAPEUTIC EXERCISES: CPT | Performed by: PHYSICAL THERAPIST

## 2024-02-09 PROCEDURE — 97110 THERAPEUTIC EXERCISES: CPT

## 2024-02-09 PROCEDURE — 97140 MANUAL THERAPY 1/> REGIONS: CPT | Performed by: PHYSICAL THERAPIST

## 2024-02-09 NOTE — PROGRESS NOTES
PHYSICAL THERAPY - MEDICARE DAILY TREATMENT NOTE (updated 3/23)      Date: 2024          Patient Name:  Evy Myrick :  1975   Medical   Diagnosis:  Left knee pain [M25.562] Treatment Diagnosis:  M25.562  LEFT KNEE PAIN    Referral Source:  Cali Spencer MD Insurance:   Payor: [facility] / Plan: [facility] / Product Type: *No Product type* /                     Patient  verified yes     Visit #   Current  / Total 4 16   Time   In / Out 758  AM   Total Treatment Time 60   Total Timed Codes 50   1:1 Treatment Time 50      [facility] Totals Reminder:  bill using total billable   min of TIMED therapeutic procedures and modalities.   8-22 min = 1 unit; 23-37 min = 2 units; 38-52 min = 3 units; 53-67 min = 4 units; 68-82 min = 5 units            SUBJECTIVE    Pain Level (0-10 scale): 0/10    Any medication changes, allergies to medications, adverse drug reactions, diagnosis change, or new procedure performed?: [x] No    [] Yes (see summary sheet for update)  Medications: Verified on Patient Summary List    Subjective functional status/changes:     Patient reports that she has been feeling much better overall and not feeling as if her leg will give out on her. She has been performing HEP daily.      OBJECTIVE      Therapeutic Procedures:  Tx Min Billable or 1:1 Min (if diff from Tx Min) Procedure, Rationale, Specifics   40  86195 Therapeutic Exercise (timed):  increase ROM, strength, coordination, balance, and proprioception to improve patient's ability to progress to PLOF and address remaining functional goals. (see flow sheet as applicable)    Antonietta May PTA worked with pt 1:1 for 15 min  Details if applicable:  see flow sheet   10  92251 Manual Therapy (timed):  decrease pain, increase tissue extensibility, and decrease trigger points to improve patient's ability to progress to PLOF and address remaining functional goals.  The manual therapy interventions were performed at a separate

## 2024-02-13 ENCOUNTER — HOSPITAL ENCOUNTER (OUTPATIENT)
Facility: HOSPITAL | Age: 49
Setting detail: RECURRING SERIES
Discharge: HOME | End: 2024-02-16
Payer: COMMERCIAL

## 2024-02-13 PROCEDURE — 97140 MANUAL THERAPY 1/> REGIONS: CPT

## 2024-02-13 PROCEDURE — 97016 VASOPNEUMATIC DEVICE THERAPY: CPT

## 2024-02-13 PROCEDURE — 97110 THERAPEUTIC EXERCISES: CPT

## 2024-02-13 NOTE — PROGRESS NOTES
PHYSICAL THERAPY - MEDICARE DAILY TREATMENT NOTE (updated 3/23)      Date: 2024          Patient Name:  Evy Myrick :  1975   Medical   Diagnosis:  Left knee pain [M25.562] Treatment Diagnosis:  M25.562  LEFT KNEE PAIN    Referral Source:  Cali Spencer MD Insurance:   Payor: [facility] / Plan: [facility] / Product Type: *No Product type* /                     Patient  verified yes     Visit #   Current  / Total 5 16   Time   In / Out 7:30 AM 8:30  AM   Total Treatment Time 60   Total Timed Codes 50   1:1 Treatment Time 50      [facility] Totals Reminder:  bill using total billable   min of TIMED therapeutic procedures and modalities.   8-22 min = 1 unit; 23-37 min = 2 units; 38-52 min = 3 units; 53-67 min = 4 units; 68-82 min = 5 units            SUBJECTIVE    Pain Level (0-10 scale): 0/10    Any medication changes, allergies to medications, adverse drug reactions, diagnosis change, or new procedure performed?: [x] No    [] Yes (see summary sheet for update)  Medications: Verified on Patient Summary List    Subjective functional status/changes:     Patient reports \"My leg feels like normal!\" States she hasn't done the KT tape and her knee is feeling good.     OBJECTIVE      Therapeutic Procedures:  Tx Min Billable or 1:1 Min (if diff from Tx Min) Procedure, Rationale, Specifics   40  28919 Therapeutic Exercise (timed):  increase ROM, strength, coordination, balance, and proprioception to improve patient's ability to progress to PLOF and address remaining functional goals. (see flow sheet as applicable)      Details if applicable:  see flow sheet   10  52994 Manual Therapy (timed):  decrease pain, increase tissue extensibility, and decrease trigger points to improve patient's ability to progress to PLOF and address remaining functional goals.  The manual therapy interventions were performed at a separate and distinct time from the therapeutic activities interventions . (see flow sheet as

## 2024-02-15 ENCOUNTER — HOSPITAL ENCOUNTER (OUTPATIENT)
Facility: HOSPITAL | Age: 49
Setting detail: RECURRING SERIES
Discharge: HOME | End: 2024-02-18
Payer: COMMERCIAL

## 2024-02-15 PROCEDURE — 97140 MANUAL THERAPY 1/> REGIONS: CPT

## 2024-02-15 PROCEDURE — 97016 VASOPNEUMATIC DEVICE THERAPY: CPT

## 2024-02-15 PROCEDURE — 97110 THERAPEUTIC EXERCISES: CPT

## 2024-02-15 NOTE — PROGRESS NOTES
PHYSICAL THERAPY - MEDICARE DAILY TREATMENT NOTE (updated 3/23)      Date: 2/15/2024          Patient Name:  Evy Myrick :  1975   Medical   Diagnosis:  Left knee pain [M25.562] Treatment Diagnosis:  M25.562  LEFT KNEE PAIN    Referral Source:  Cali Spencer MD Insurance:   Payor: [facility] / Plan: [facility] / Product Type: *No Product type* /                     Patient  verified yes     Visit #   Current  / Total 6 16   Time   In / Out 7:30 AM 8:35  AM   Total Treatment Time 65   Total Timed Codes 60   1:1 Treatment Time 40       BC Totals Reminder:  bill using total billable   min of TIMED therapeutic procedures and modalities.   8-22 min = 1 unit; 23-37 min = 2 units; 38-52 min = 3 units; 53-67 min = 4 units; 68-82 min = 5 units            SUBJECTIVE    Pain Level (0-10 scale): 0/10    Any medication changes, allergies to medications, adverse drug reactions, diagnosis change, or new procedure performed?: [x] No    [] Yes (see summary sheet for update)  Medications: Verified on Patient Summary List    Subjective functional status/changes:     Patient reports she did well after last visit. Feels like her gait has improved, no longer limping or having the sensation of instability. 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    OBJECTIVE      Therapeutic Procedures:  Tx Min Billable or 1:1 Min (if diff from Tx Min) Procedure, Rationale, Specifics   50  25367 Therapeutic Exercise (timed):  increase ROM, strength, coordination, balance, and proprioception to improve patient's ability to progress to PLOF and address remaining functional goals. (see flow sheet as applicable)      Details if applicable:  see flow sheet   10  08845 Manual Therapy (timed):  decrease pain, increase tissue extensibility, and decrease trigger points to improve patient's ability to progress to PLOF and address remaining

## 2024-02-20 ENCOUNTER — HOSPITAL ENCOUNTER (OUTPATIENT)
Facility: HOSPITAL | Age: 49
Setting detail: RECURRING SERIES
Discharge: HOME | End: 2024-02-23
Payer: COMMERCIAL

## 2024-02-20 PROCEDURE — 97140 MANUAL THERAPY 1/> REGIONS: CPT | Performed by: PHYSICAL THERAPIST

## 2024-02-20 PROCEDURE — 97110 THERAPEUTIC EXERCISES: CPT | Performed by: PHYSICAL THERAPIST

## 2024-02-20 NOTE — PROGRESS NOTES
PHYSICAL THERAPY - MEDICARE DAILY TREATMENT NOTE (updated 3/23)      Date: 2024          Patient Name:  Evy Myrick :  1975   Medical   Diagnosis:  Left knee pain [M25.562] Treatment Diagnosis:  M25.562  LEFT KNEE PAIN    Referral Source:  Cali Spencer MD Insurance:   Payor: [facility] / Plan: [facility] / Product Type: *No Product type* /                     Patient  verified yes     Visit #   Current  / Total 7 16   Time   In / Out 416  PM   Total Treatment Time 60   Total Timed Codes 60   1:1 Treatment Time 55      [facility] Totals Reminder:  bill using total billable   min of TIMED therapeutic procedures and modalities.   8-22 min = 1 unit; 23-37 min = 2 units; 38-52 min = 3 units; 53-67 min = 4 units; 68-82 min = 5 units            SUBJECTIVE    Pain Level (0-10 scale): 0/10    Any medication changes, allergies to medications, adverse drug reactions, diagnosis change, or new procedure performed?: [x] No    [] Yes (see summary sheet for update)  Medications: Verified on Patient Summary List    Subjective functional status/changes:     Patient reports that she feels stronger and has not had any significant pain over the past week, but continues to get lateral L knee discomfort with increased activities. She has a bosu ball at home and is wondering if there are any exercises she could perform on that.     OBJECTIVE      Therapeutic Procedures:  Tx Min Billable or 1:1 Min (if diff from Tx Min) Procedure, Rationale, Specifics   50 29 88766 Therapeutic Exercise (timed):  increase ROM, strength, coordination, balance, and proprioception to improve patient's ability to progress to PLOF and address remaining functional goals. (see flow sheet as applicable)      Details if applicable:  see flow sheet   10 27 65340 Manual Therapy (timed):  decrease pain, increase tissue extensibility, and decrease trigger points to improve patient's ability to progress to PLOF and address remaining

## 2024-02-22 ENCOUNTER — HOSPITAL ENCOUNTER (OUTPATIENT)
Facility: HOSPITAL | Age: 49
Setting detail: RECURRING SERIES
Discharge: HOME | End: 2024-02-25
Payer: COMMERCIAL

## 2024-02-22 PROCEDURE — 97110 THERAPEUTIC EXERCISES: CPT

## 2024-02-22 PROCEDURE — 97140 MANUAL THERAPY 1/> REGIONS: CPT

## 2024-02-22 NOTE — THERAPY DISCHARGE
Physical Therapy at [facility],   a part of [facility]  [address] [id number]  Phone: [phone]  Fax: [phone]     DISCHARGE SUMMARY  Patient Name: Evy Myrick : 1975   Treatment/Medical Diagnosis: Left knee pain [M25.562]   Referral Source: Cali Spencer MD     Date of Initial Visit: 24 Attended Visits: 8 Missed Visits: 0     SUMMARY OF TREATMENT  Skilled PT services focused on restoring L knee AROM, LLE strength, and gait mechanics. She has made significant improvements in all aspects and reports getting back into normal activities. She has met all goals at this time and reached maximal benefit from skilled PT services at this time.    CURRENT STATUS  R Knee AROM 0-140   L Knee AROM 0 - 138               Increased IR L hip     Joint Mobility Assessment: hypermobile bilateral patella femoral jts no pain with testing     Flexibility: improved L quad, gastroc        LOWER QUARTER                            MUSCLE STRENGTH  KEY                                                                             R                      L  0 - No Contraction                   Knee ext                      5                      5  1 - Trace                                           flex                     5                   5  2 - Poor                                   Hip ext                         5                    5  3 - Fair                                           flex                         5                      5  4 - Good                                        abd                        5                     5  5 - Normal                                     add                        4+                      5                                                  Ankle DF                     5                      5                                                            PF                     5                      5

## 2024-02-22 NOTE — PROGRESS NOTES
PHYSICAL THERAPY - MEDICARE DAILY TREATMENT NOTE (updated 3/23)      Date: 2024          Patient Name:  Evy Myrick :  1975   Medical   Diagnosis:  Left knee pain [M25.562] Treatment Diagnosis:  M25.562  LEFT KNEE PAIN    Referral Source:  Cali Spencer MD Insurance:   Payor: [facility] / Plan: [facility] / Product Type: *No Product type* /                     Patient  verified yes     Visit #   Current  / Total 8 16   Time   In / Out 7:19 AM 8:20 AM   Total Treatment Time 61   Total Timed Codes 51   1:1 Treatment Time 38      [facility] Totals Reminder:  bill using total billable   min of TIMED therapeutic procedures and modalities.   8-22 min = 1 unit; 23-37 min = 2 units; 38-52 min = 3 units; 53-67 min = 4 units; 68-82 min = 5 units            SUBJECTIVE    Pain Level (0-10 scale): 0/10    Any medication changes, allergies to medications, adverse drug reactions, diagnosis change, or new procedure performed?: [x] No    [] Yes (see summary sheet for update)  Medications: Verified on Patient Summary List    Subjective functional status/changes:     Patient reports very mild TTP along distal IT band but otherwise is doing well. No increase in knee pain or instability with walking or stair navigation. Able to perform a squat without knee pain.    OBJECTIVE  Posture:  WNL  Other Observations:  bilateral knees fall into valgus position with squat, reciprocal gait pattern on stairs with bi knees into valgus position  Gait and Functional Mobility:  good ROM L knee during swing and stance phase, internal rotation of LLE  Palpation: mild TTP distal IT band     R Knee AROM 0-140   L Knee AROM 0 - 138               Increased IR L hip     Joint Mobility Assessment: hypermobile bilateral patella femoral jts no pain with testing     Flexibility: improved L quad, gastroc        LOWER QUARTER                            MUSCLE STRENGTH  KEY

## 2024-10-18 ENCOUNTER — HOSPITAL ENCOUNTER (OUTPATIENT)
Facility: HOSPITAL | Age: 49
Discharge: HOME | End: 2024-10-21
Payer: COMMERCIAL

## 2024-10-18 DIAGNOSIS — Z12.31 VISIT FOR SCREENING MAMMOGRAM: ICD-10-CM

## 2024-10-18 PROCEDURE — 77063 BREAST TOMOSYNTHESIS BI: CPT

## 2025-05-26 ENCOUNTER — OFFICE VISIT (OUTPATIENT)
Age: 50
End: 2025-05-26

## 2025-05-26 VITALS
HEIGHT: 63 IN | DIASTOLIC BLOOD PRESSURE: 106 MMHG | SYSTOLIC BLOOD PRESSURE: 167 MMHG | OXYGEN SATURATION: 96 % | WEIGHT: 177.8 LBS | TEMPERATURE: 98 F | RESPIRATION RATE: 18 BRPM | HEART RATE: 75 BPM | BODY MASS INDEX: 31.5 KG/M2

## 2025-05-26 DIAGNOSIS — T14.8XXA MUSCLE STRAIN: Primary | ICD-10-CM

## 2025-05-26 PROBLEM — N95.1 MENOPAUSAL FLUSHING: Status: ACTIVE | Noted: 2024-08-09

## 2025-05-26 RX ORDER — METHOCARBAMOL 500 MG/1
500 TABLET, FILM COATED ORAL 3 TIMES DAILY
Qty: 15 TABLET | Refills: 0 | Status: SHIPPED | OUTPATIENT
Start: 2025-05-26 | End: 2025-05-26

## 2025-05-26 RX ORDER — METHOCARBAMOL 500 MG/1
500 TABLET, FILM COATED ORAL 3 TIMES DAILY
Qty: 15 TABLET | Refills: 0 | Status: SHIPPED | OUTPATIENT
Start: 2025-05-26 | End: 2025-05-31

## 2025-05-26 NOTE — PATIENT INSTRUCTIONS
Thank you for visiting [facility] today.     Robaxin as needed  Heat  Lidocaine patches (12 hours on and 12 hours off)      A survey will be sent shortly to your phone/email.  Please complete this so we may know how to better serve you in the future.     If you begin to have shortness of breath, chest pain or uncontrollable fever of 100.4 or greater, please go to the Emergency Room.

## 2025-05-26 NOTE — PROGRESS NOTES
Subjective     Chief Complaint   Patient presents with    Other     After sitting at her desk she strained her left shoulder, no trauma.        Patient is a 49-year-old female presenting with left shoulder pain.  Symptoms began on Thursday.  She reports taking a new job and sitting in a desk that is not adequately designed.  She has been using ibuprofen, ice and heat with little relief.  She is left-hand dominant.          Past Medical History:   Diagnosis Date    Asthma     Eczema     H/O seasonal allergies        Past Surgical History:   Procedure Laterality Date    WISDOM TOOTH EXTRACTION  15years old        Family History   Problem Relation Age of Onset    Hypertension Paternal Grandmother     Stroke Paternal Grandmother     Diabetes Paternal Grandmother     Diabetes Paternal Grandfather     Hypertension Paternal Grandfather     Hypertension Mother     High Blood Pressure Mother     Diabetes Father     Diabetes Maternal Grandmother     Hypertension Maternal Grandmother     Stroke Maternal Grandmother     Cancer Maternal Grandmother     Cancer Maternal Grandfather         lung-heavy smoker     Diabetes Maternal Grandfather     Hypertension Maternal Grandfather        Allergies   Allergen Reactions    Latex Rash    Cat Dander Shortness Of Breath    Avocado Hives     Wheezing     Codeine Other (See Comments)     Migraine     Tomato      Other reaction(s): Runny Nose       Social History     Tobacco Use    Smoking status: Never    Smokeless tobacco: Never   Substance Use Topics    Alcohol use: Not Currently    Drug use: No       Vitals:    05/26/25 0931   BP: (!) 167/106   Pulse:    Resp:    Temp:    SpO2:        Objective     Physical Exam  Vitals and nursing note reviewed.   Constitutional:       General: She is not in acute distress.     Appearance: Normal appearance. She is not ill-appearing.   HENT:      Head: Normocephalic and atraumatic.   Cardiovascular:      Rate and Rhythm: Normal rate.      Pulses: Normal

## 2025-06-17 ENCOUNTER — OFFICE VISIT (OUTPATIENT)
Age: 50
End: 2025-06-17
Payer: COMMERCIAL

## 2025-06-17 VITALS
HEART RATE: 78 BPM | RESPIRATION RATE: 18 BRPM | OXYGEN SATURATION: 98 % | WEIGHT: 172 LBS | DIASTOLIC BLOOD PRESSURE: 90 MMHG | SYSTOLIC BLOOD PRESSURE: 160 MMHG | BODY MASS INDEX: 30.47 KG/M2

## 2025-06-17 DIAGNOSIS — R03.0 ELEVATED BLOOD PRESSURE READING: Primary | ICD-10-CM

## 2025-06-17 DIAGNOSIS — Z86.39 H/O MIXED HYPERLIPIDEMIA: ICD-10-CM

## 2025-06-17 DIAGNOSIS — R73.09 ELEVATED HEMOGLOBIN A1C: ICD-10-CM

## 2025-06-17 DIAGNOSIS — E66.811 OBESITY (BMI 30.0-34.9): ICD-10-CM

## 2025-06-17 DIAGNOSIS — M25.512 ACUTE PAIN OF LEFT SHOULDER: ICD-10-CM

## 2025-06-17 DIAGNOSIS — R03.0 ELEVATED BLOOD PRESSURE READING: ICD-10-CM

## 2025-06-17 PROCEDURE — 99214 OFFICE O/P EST MOD 30 MIN: CPT | Performed by: INTERNAL MEDICINE

## 2025-06-17 RX ORDER — METHYLPREDNISOLONE 4 MG/1
TABLET ORAL
COMMUNITY
Start: 2025-06-08 | End: 2025-06-17

## 2025-06-17 SDOH — ECONOMIC STABILITY: FOOD INSECURITY: WITHIN THE PAST 12 MONTHS, YOU WORRIED THAT YOUR FOOD WOULD RUN OUT BEFORE YOU GOT MONEY TO BUY MORE.: NEVER TRUE

## 2025-06-17 SDOH — ECONOMIC STABILITY: FOOD INSECURITY: WITHIN THE PAST 12 MONTHS, THE FOOD YOU BOUGHT JUST DIDN'T LAST AND YOU DIDN'T HAVE MONEY TO GET MORE.: NEVER TRUE

## 2025-06-17 ASSESSMENT — PATIENT HEALTH QUESTIONNAIRE - PHQ9
SUM OF ALL RESPONSES TO PHQ QUESTIONS 1-9: 0
2. FEELING DOWN, DEPRESSED OR HOPELESS: NOT AT ALL
SUM OF ALL RESPONSES TO PHQ QUESTIONS 1-9: 0
SUM OF ALL RESPONSES TO PHQ QUESTIONS 1-9: 0
1. LITTLE INTEREST OR PLEASURE IN DOING THINGS: NOT AT ALL
SUM OF ALL RESPONSES TO PHQ QUESTIONS 1-9: 0

## 2025-06-17 NOTE — PROGRESS NOTES
Have you been to the ER, urgent care clinic since your last visit?  Hospitalized since your last visit?   2 weeks ago [facility] for Shoulder pain     Have you seen or consulted any other health care providers outside our system since your last visit?   NO     “Have you had a pap smear?”    YES - Where: [facility] Krysten Hogan/[facility] to request most recent records if not in the chart    Date of last Cervical Cancer screen (HPV or PAP): 10/22/2018

## 2025-06-19 ASSESSMENT — ENCOUNTER SYMPTOMS: RESPIRATORY NEGATIVE: 1

## 2025-06-19 NOTE — PROGRESS NOTES
Subjective    Evy Bruner is a 49 y.o. female who presents today for the following:  Chief Complaint   Patient presents with    Dizziness    Other     Concerned about weight       History of Present Illness  The patient presents for evaluation of left shoulder pain, weight gain, and elevated blood pressure.    She reports experiencing left shoulder pain, which she suspects may be related to her new job that involves extensive sitting and typing. She has been researching frozen shoulder on the internet and is concerned that this may be her condition. The pain is described as throbbing, occasionally radiating down her arm to her wrist. She is left-handed and the pain is localized to her dominant side. She does not experience any associated chest pain. Currently, she is not experiencing any pain, but recalls significant relief following a course of steroids prescribed by [facility] two weeks ago. Prior to this, she sought treatment at an urgent care facility where she was given a muscle relaxant, which unfortunately did not alleviate her symptoms and instead caused a week-long period of grogginess.    She expresses concern about potential weight gain, although she is uncertain of her previous weight. She attributes this to recent stressors related to her job, personal life, and school. She also notes that she will be turning 50 next week and has never previously experienced weight gain.    She reports no history of elevated blood pressure. However, she does experience occasional morning dizziness. She has never been prescribed antihypertensive medication. She has a family history of hypertension in her mother.    FAMILY HISTORY  Her mother has a history of elevated blood pressure.        PMH/PSH/Allergies/Social History/medication list and most recent studies reviewed with patient.     reports that she has never smoked. She has never used smokeless tobacco.    reports that she does not currently use

## 2025-06-20 LAB
ALBUMIN SERPL-MCNC: 3.9 G/DL (ref 3.5–5)
ALBUMIN/GLOB SERPL: 1.4 (ref 1.1–2.2)
ALP SERPL-CCNC: 48 U/L (ref 45–117)
ALT SERPL-CCNC: 21 U/L (ref 12–78)
ANION GAP SERPL CALC-SCNC: 4 MMOL/L (ref 2–12)
AST SERPL-CCNC: 12 U/L (ref 15–37)
BILIRUB SERPL-MCNC: 0.5 MG/DL (ref 0.2–1)
BUN SERPL-MCNC: 10 MG/DL (ref 6–20)
BUN/CREAT SERPL: 10 (ref 12–20)
CALCIUM SERPL-MCNC: 9.1 MG/DL (ref 8.5–10.1)
CHLORIDE SERPL-SCNC: 108 MMOL/L (ref 97–108)
CHOLEST SERPL-MCNC: 222 MG/DL
CO2 SERPL-SCNC: 27 MMOL/L (ref 21–32)
CREAT SERPL-MCNC: 1.05 MG/DL (ref 0.55–1.02)
EST. AVERAGE GLUCOSE BLD GHB EST-MCNC: 114 MG/DL
GLOBULIN SER CALC-MCNC: 2.8 G/DL (ref 2–4)
GLUCOSE SERPL-MCNC: 100 MG/DL (ref 65–100)
HBA1C MFR BLD: 5.6 % (ref 4–5.6)
HDLC SERPL-MCNC: 68 MG/DL
HDLC SERPL: 3.3 (ref 0–5)
LDLC SERPL CALC-MCNC: 134.8 MG/DL (ref 0–100)
POTASSIUM SERPL-SCNC: 4.4 MMOL/L (ref 3.5–5.1)
PROT SERPL-MCNC: 6.7 G/DL (ref 6.4–8.2)
SODIUM SERPL-SCNC: 139 MMOL/L (ref 136–145)
TRIGL SERPL-MCNC: 96 MG/DL
TSH SERPL DL<=0.05 MIU/L-ACNC: 0.49 UIU/ML (ref 0.36–3.74)
VLDLC SERPL CALC-MCNC: 19.2 MG/DL

## 2025-06-25 ENCOUNTER — RESULTS FOLLOW-UP (OUTPATIENT)
Age: 50
End: 2025-06-25

## 2025-09-05 DIAGNOSIS — Z23 COVID-19 VACCINE SERIES STARTED: Primary | ICD-10-CM
